# Patient Record
Sex: FEMALE | Race: WHITE | NOT HISPANIC OR LATINO | Employment: FULL TIME | URBAN - METROPOLITAN AREA
[De-identification: names, ages, dates, MRNs, and addresses within clinical notes are randomized per-mention and may not be internally consistent; named-entity substitution may affect disease eponyms.]

---

## 2017-01-25 ENCOUNTER — LAB REQUISITION (OUTPATIENT)
Dept: LAB | Facility: HOSPITAL | Age: 27
End: 2017-01-25

## 2017-01-25 DIAGNOSIS — N80.30 ENDOMETRIOSIS OF PELVIC PERITONEUM: ICD-10-CM

## 2017-01-25 PROCEDURE — 88305 TISSUE EXAM BY PATHOLOGIST: CPT | Performed by: SPECIALIST

## 2017-01-26 LAB
CYTO UR: NORMAL
LAB AP CASE REPORT: NORMAL
LAB AP CLINICAL INFORMATION: NORMAL
Lab: NORMAL
PATH REPORT.FINAL DX SPEC: NORMAL
PATH REPORT.GROSS SPEC: NORMAL

## 2017-05-25 ENCOUNTER — TRANSCRIBE ORDERS (OUTPATIENT)
Dept: LAB | Facility: HOSPITAL | Age: 27
End: 2017-05-25

## 2017-05-25 ENCOUNTER — APPOINTMENT (OUTPATIENT)
Dept: LAB | Facility: HOSPITAL | Age: 27
End: 2017-05-25

## 2017-05-25 DIAGNOSIS — Z34.90 PREGNANCY, UNSPECIFIED GESTATIONAL AGE: Primary | ICD-10-CM

## 2017-05-25 LAB — HCG INTACT+B SERPL-ACNC: 181 MIU/ML

## 2017-05-25 PROCEDURE — 36415 COLL VENOUS BLD VENIPUNCTURE: CPT | Performed by: SPECIALIST

## 2017-05-25 PROCEDURE — 84702 CHORIONIC GONADOTROPIN TEST: CPT | Performed by: SPECIALIST

## 2017-07-11 LAB
EXTERNAL HEPATITIS B SURFACE ANTIGEN: NEGATIVE
EXTERNAL RUBELLA QUALITATIVE: NORMAL
EXTERNAL SYPHILIS RPR SCREEN: NORMAL

## 2017-08-09 ENCOUNTER — TRANSCRIBE ORDERS (OUTPATIENT)
Dept: OBSTETRICS AND GYNECOLOGY | Facility: HOSPITAL | Age: 27
End: 2017-08-09

## 2017-08-09 DIAGNOSIS — Z13.89 ENCOUNTER FOR ROUTINE SCREENING FOR MALFORMATION USING ULTRASONICS: Primary | ICD-10-CM

## 2017-09-05 ENCOUNTER — APPOINTMENT (OUTPATIENT)
Dept: WOMENS IMAGING | Facility: HOSPITAL | Age: 27
End: 2017-09-05
Attending: OBSTETRICS & GYNECOLOGY

## 2017-09-06 ENCOUNTER — OFFICE VISIT (OUTPATIENT)
Dept: OBSTETRICS AND GYNECOLOGY | Facility: HOSPITAL | Age: 27
End: 2017-09-06

## 2017-09-06 ENCOUNTER — HOSPITAL ENCOUNTER (OUTPATIENT)
Dept: WOMENS IMAGING | Facility: HOSPITAL | Age: 27
Discharge: HOME | End: 2017-09-06
Attending: OBSTETRICS & GYNECOLOGY | Admitting: OBSTETRICS & GYNECOLOGY

## 2017-09-06 VITALS
HEIGHT: 67 IN | SYSTOLIC BLOOD PRESSURE: 128 MMHG | DIASTOLIC BLOOD PRESSURE: 86 MMHG | WEIGHT: 247 LBS | BODY MASS INDEX: 38.77 KG/M2

## 2017-09-06 DIAGNOSIS — O30.049 TWIN PREGNANCY, TWINS DICHORIONIC AND DIAMNIOTIC: Primary | ICD-10-CM

## 2017-09-06 DIAGNOSIS — Z13.89 ENCOUNTER FOR ROUTINE SCREENING FOR MALFORMATION USING ULTRASONICS: ICD-10-CM

## 2017-09-06 PROCEDURE — 76811 OB US DETAILED SNGL FETUS: CPT | Performed by: OBSTETRICS & GYNECOLOGY

## 2017-09-06 PROCEDURE — 76812 OB US DETAILED ADDL FETUS: CPT | Performed by: OBSTETRICS & GYNECOLOGY

## 2017-09-06 PROCEDURE — 76812 OB US DETAILED ADDL FETUS: CPT

## 2017-09-06 PROCEDURE — 99241 PR OFFICE CONSULTATION NEW/ESTAB PATIENT 15 MIN: CPT | Performed by: OBSTETRICS & GYNECOLOGY

## 2017-09-06 PROCEDURE — 76811 OB US DETAILED SNGL FETUS: CPT

## 2017-09-06 RX ORDER — FOLIC ACID 1 MG/1
4 TABLET ORAL DAILY
COMMUNITY
End: 2017-09-27

## 2017-09-06 RX ORDER — PRENATAL WITH FERROUS FUM AND FOLIC ACID 3080; 920; 120; 400; 22; 1.84; 3; 20; 10; 1; 12; 200; 27; 25; 2 [IU]/1; [IU]/1; MG/1; [IU]/1; MG/1; MG/1; MG/1; MG/1; MG/1; MG/1; UG/1; MG/1; MG/1; MG/1; MG/1
TABLET ORAL DAILY
COMMUNITY

## 2017-09-06 NOTE — PROGRESS NOTES
Documentation of the ultrasound findings, images, and interpretations will be available in the patient's ViewPoint report located in the chart review imaging tab in [employer].

## 2017-09-27 ENCOUNTER — OFFICE VISIT (OUTPATIENT)
Dept: OBSTETRICS AND GYNECOLOGY | Facility: HOSPITAL | Age: 27
End: 2017-09-27

## 2017-09-27 ENCOUNTER — HOSPITAL ENCOUNTER (OUTPATIENT)
Dept: WOMENS IMAGING | Facility: HOSPITAL | Age: 27
Discharge: HOME | End: 2017-09-27
Attending: OBSTETRICS & GYNECOLOGY | Admitting: OBSTETRICS & GYNECOLOGY

## 2017-09-27 VITALS — WEIGHT: 258.6 LBS | SYSTOLIC BLOOD PRESSURE: 141 MMHG | BODY MASS INDEX: 40.5 KG/M2 | DIASTOLIC BLOOD PRESSURE: 63 MMHG

## 2017-09-27 DIAGNOSIS — O30.049 TWIN PREGNANCY, TWINS DICHORIONIC AND DIAMNIOTIC: Primary | ICD-10-CM

## 2017-09-27 DIAGNOSIS — O26.872 SHORT CERVIX DURING PREGNANCY IN SECOND TRIMESTER: ICD-10-CM

## 2017-09-27 DIAGNOSIS — E66.01 MORBID OBESITY DUE TO EXCESS CALORIES (HCC): ICD-10-CM

## 2017-09-27 DIAGNOSIS — O30.049 TWIN PREGNANCY, TWINS DICHORIONIC AND DIAMNIOTIC: ICD-10-CM

## 2017-09-27 PROCEDURE — 76817 TRANSVAGINAL US OBSTETRIC: CPT | Performed by: OBSTETRICS & GYNECOLOGY

## 2017-09-27 PROCEDURE — 76816 OB US FOLLOW-UP PER FETUS: CPT | Performed by: OBSTETRICS & GYNECOLOGY

## 2017-09-27 PROCEDURE — 76817 TRANSVAGINAL US OBSTETRIC: CPT

## 2017-09-27 PROCEDURE — 93325 DOPPLER ECHO COLOR FLOW MAPG: CPT | Performed by: OBSTETRICS & GYNECOLOGY

## 2017-09-27 PROCEDURE — 93325 DOPPLER ECHO COLOR FLOW MAPG: CPT

## 2017-09-27 PROCEDURE — 76825 ECHO EXAM OF FETAL HEART: CPT

## 2017-09-27 PROCEDURE — 76816 OB US FOLLOW-UP PER FETUS: CPT

## 2017-09-27 PROCEDURE — 76825 ECHO EXAM OF FETAL HEART: CPT | Performed by: OBSTETRICS & GYNECOLOGY

## 2017-09-27 PROCEDURE — 99242 OFF/OP CONSLTJ NEW/EST SF 20: CPT | Performed by: OBSTETRICS & GYNECOLOGY

## 2017-09-27 RX ORDER — UREA 10 %
3200 LOTION (ML) TOPICAL DAILY
COMMUNITY
End: 2018-01-16 | Stop reason: HOSPADM

## 2017-09-27 NOTE — PROGRESS NOTES
Documentation of the ultrasound findings, images, and interpretations will be available in the patient's ViewPoint report located in the chart review imaging tab in [employer].

## 2017-09-28 ENCOUNTER — HOSPITAL ENCOUNTER (INPATIENT)
Facility: HOSPITAL | Age: 27
LOS: 6 days | Discharge: HOME | End: 2017-10-04
Attending: OBSTETRICS & GYNECOLOGY | Admitting: OBSTETRICS & GYNECOLOGY

## 2017-09-28 LAB
ABO GROUP BLD: NORMAL
BASOPHILS # BLD AUTO: 0.03 10*3/MM3 (ref 0–0.2)
BASOPHILS NFR BLD AUTO: 0.2 % (ref 0–1)
BLD GP AB SCN SERPL QL: NEGATIVE
DEPRECATED RDW RBC AUTO: 48.8 FL (ref 37–54)
DEPRECATED RDW RBC AUTO: 49.9 FL (ref 37–54)
EOSINOPHIL # BLD AUTO: 0.11 10*3/MM3 (ref 0–0.3)
EOSINOPHIL NFR BLD AUTO: 0.7 % (ref 0–3)
ERYTHROCYTE [DISTWIDTH] IN BLOOD BY AUTOMATED COUNT: 14.4 % (ref 11.3–14.5)
ERYTHROCYTE [DISTWIDTH] IN BLOOD BY AUTOMATED COUNT: 14.5 % (ref 11.3–14.5)
HCT VFR BLD AUTO: 35.6 % (ref 34.5–44)
HCT VFR BLD AUTO: 36.9 % (ref 34.5–44)
HGB BLD-MCNC: 11.8 G/DL (ref 11.5–15.5)
HGB BLD-MCNC: 12.5 G/DL (ref 11.5–15.5)
IMM GRANULOCYTES # BLD: 0.66 10*3/MM3 (ref 0–0.03)
IMM GRANULOCYTES NFR BLD: 4.5 % (ref 0–0.6)
LYMPHOCYTES # BLD AUTO: 1.65 10*3/MM3 (ref 0.6–4.8)
LYMPHOCYTES NFR BLD AUTO: 11.1 % (ref 24–44)
MCH RBC QN AUTO: 31.4 PG (ref 27–31)
MCH RBC QN AUTO: 32 PG (ref 27–31)
MCHC RBC AUTO-ENTMCNC: 33.1 G/DL (ref 32–36)
MCHC RBC AUTO-ENTMCNC: 33.9 G/DL (ref 32–36)
MCV RBC AUTO: 94.4 FL (ref 80–99)
MCV RBC AUTO: 94.7 FL (ref 80–99)
MONOCYTES # BLD AUTO: 0.8 10*3/MM3 (ref 0–1)
MONOCYTES NFR BLD AUTO: 5.4 % (ref 0–12)
NEUTROPHILS # BLD AUTO: 11.58 10*3/MM3 (ref 1.5–8.3)
NEUTROPHILS NFR BLD AUTO: 78.1 % (ref 41–71)
PLATELET # BLD AUTO: 242 10*3/MM3 (ref 150–450)
PLATELET # BLD AUTO: 243 10*3/MM3 (ref 150–450)
PMV BLD AUTO: 10.3 FL (ref 6–12)
PMV BLD AUTO: 10.4 FL (ref 6–12)
RBC # BLD AUTO: 3.76 10*6/MM3 (ref 3.89–5.14)
RBC # BLD AUTO: 3.91 10*6/MM3 (ref 3.89–5.14)
RH BLD: POSITIVE
WBC NRBC COR # BLD: 14.59 10*3/MM3 (ref 3.5–10.8)
WBC NRBC COR # BLD: 14.83 10*3/MM3 (ref 3.5–10.8)

## 2017-09-28 PROCEDURE — 86900 BLOOD TYPING SEROLOGIC ABO: CPT | Performed by: OBSTETRICS & GYNECOLOGY

## 2017-09-28 PROCEDURE — 85027 COMPLETE CBC AUTOMATED: CPT | Performed by: OBSTETRICS & GYNECOLOGY

## 2017-09-28 PROCEDURE — 85025 COMPLETE CBC W/AUTO DIFF WBC: CPT | Performed by: OBSTETRICS & GYNECOLOGY

## 2017-09-28 PROCEDURE — 86901 BLOOD TYPING SEROLOGIC RH(D): CPT | Performed by: OBSTETRICS & GYNECOLOGY

## 2017-09-28 PROCEDURE — 25010000002 MAGNESIUM SULFATE-LACT RINGERS 40 GM/580ML SOLUTION: Performed by: OBSTETRICS & GYNECOLOGY

## 2017-09-28 PROCEDURE — 86850 RBC ANTIBODY SCREEN: CPT | Performed by: OBSTETRICS & GYNECOLOGY

## 2017-09-28 PROCEDURE — 99221 1ST HOSP IP/OBS SF/LOW 40: CPT | Performed by: OBSTETRICS & GYNECOLOGY

## 2017-09-28 RX ORDER — ACETAMINOPHEN 325 MG/1
650 TABLET ORAL EVERY 4 HOURS PRN
Status: DISCONTINUED | OUTPATIENT
Start: 2017-09-28 | End: 2017-10-04 | Stop reason: HOSPADM

## 2017-09-28 RX ORDER — FAMOTIDINE 20 MG/1
20 TABLET, FILM COATED ORAL 2 TIMES DAILY PRN
Status: DISCONTINUED | OUTPATIENT
Start: 2017-09-28 | End: 2017-10-04 | Stop reason: HOSPADM

## 2017-09-28 RX ORDER — ONDANSETRON 2 MG/ML
4 INJECTION INTRAMUSCULAR; INTRAVENOUS EVERY 8 HOURS PRN
Status: DISCONTINUED | OUTPATIENT
Start: 2017-09-28 | End: 2017-10-04 | Stop reason: HOSPADM

## 2017-09-28 RX ORDER — CEFAZOLIN SODIUM 2 G/100ML
2 INJECTION, SOLUTION INTRAVENOUS ONCE
Status: DISCONTINUED | OUTPATIENT
Start: 2017-09-28 | End: 2017-09-28

## 2017-09-28 RX ORDER — PRENATAL VIT/IRON FUM/FOLIC AC 27MG-0.8MG
1 TABLET ORAL DAILY
Status: DISCONTINUED | OUTPATIENT
Start: 2017-09-28 | End: 2017-10-01

## 2017-09-28 RX ORDER — LIDOCAINE HYDROCHLORIDE 10 MG/ML
5 INJECTION, SOLUTION INFILTRATION; PERINEURAL AS NEEDED
Status: DISCONTINUED | OUTPATIENT
Start: 2017-09-28 | End: 2017-09-28

## 2017-09-28 RX ORDER — DEXTROSE AND SODIUM CHLORIDE 5; .2 G/100ML; G/100ML
100 INJECTION, SOLUTION INTRAVENOUS CONTINUOUS
Status: DISCONTINUED | OUTPATIENT
Start: 2017-09-28 | End: 2017-10-04 | Stop reason: HOSPADM

## 2017-09-28 RX ORDER — SODIUM CHLORIDE 0.9 % (FLUSH) 0.9 %
1-10 SYRINGE (ML) INJECTION AS NEEDED
Status: DISCONTINUED | OUTPATIENT
Start: 2017-09-28 | End: 2017-10-04 | Stop reason: HOSPADM

## 2017-09-28 RX ORDER — MAGNESIUM SULF/RINGERS LACTATE 40 G/500ML
2 PLASTIC BAG, INJECTION (ML) INTRAVENOUS CONTINUOUS
Status: DISPENSED | OUTPATIENT
Start: 2017-09-28 | End: 2017-10-01

## 2017-09-28 RX ORDER — SODIUM CHLORIDE 0.9 % (FLUSH) 0.9 %
1-10 SYRINGE (ML) INJECTION AS NEEDED
Status: DISCONTINUED | OUTPATIENT
Start: 2017-09-28 | End: 2017-10-02

## 2017-09-28 RX ORDER — LIDOCAINE HYDROCHLORIDE 10 MG/ML
5 INJECTION, SOLUTION INFILTRATION; PERINEURAL AS NEEDED
Status: DISCONTINUED | OUTPATIENT
Start: 2017-09-28 | End: 2017-10-02

## 2017-09-28 RX ORDER — MAGNESIUM SULF/RINGERS LACTATE 40 G/500ML
PLASTIC BAG, INJECTION (ML) INTRAVENOUS
Status: DISPENSED
Start: 2017-09-28 | End: 2017-09-28

## 2017-09-28 RX ADMIN — DEXTROSE AND SODIUM CHLORIDE 100 ML/HR: 5; 200 INJECTION, SOLUTION INTRAVENOUS at 10:30

## 2017-09-28 RX ADMIN — DEXTROSE AND SODIUM CHLORIDE 100 ML/HR: 5; 200 INJECTION, SOLUTION INTRAVENOUS at 21:23

## 2017-09-28 RX ADMIN — Medication 2 G/HR: at 12:00

## 2017-09-28 RX ADMIN — ACETAMINOPHEN 650 MG: 325 TABLET, FILM COATED ORAL at 20:06

## 2017-09-29 ENCOUNTER — APPOINTMENT (OUTPATIENT)
Dept: WOMENS IMAGING | Facility: HOSPITAL | Age: 27
End: 2017-09-29

## 2017-09-29 PROCEDURE — 25010000002 MAGNESIUM SULFATE-LACT RINGERS 40 GM/580ML SOLUTION: Performed by: OBSTETRICS & GYNECOLOGY

## 2017-09-29 PROCEDURE — 76817 TRANSVAGINAL US OBSTETRIC: CPT

## 2017-09-29 PROCEDURE — 99231 SBSQ HOSP IP/OBS SF/LOW 25: CPT | Performed by: OBSTETRICS & GYNECOLOGY

## 2017-09-29 PROCEDURE — 76817 TRANSVAGINAL US OBSTETRIC: CPT | Performed by: OBSTETRICS & GYNECOLOGY

## 2017-09-29 RX ORDER — AZITHROMYCIN 250 MG/1
250 TABLET, FILM COATED ORAL DAILY
Status: COMPLETED | OUTPATIENT
Start: 2017-09-30 | End: 2017-10-03

## 2017-09-29 RX ORDER — AZITHROMYCIN 250 MG/1
500 TABLET, FILM COATED ORAL DAILY
Status: COMPLETED | OUTPATIENT
Start: 2017-09-29 | End: 2017-09-29

## 2017-09-29 RX ADMIN — AZITHROMYCIN 500 MG: 250 TABLET, FILM COATED ORAL at 10:29

## 2017-09-29 RX ADMIN — PRENATAL VIT W/ FE FUMARATE-FA TAB 27-0.8 MG 1 TABLET: 27-0.8 TAB at 09:26

## 2017-09-29 RX ADMIN — Medication 2 G/HR: at 03:10

## 2017-09-29 RX ADMIN — ACETAMINOPHEN 650 MG: 325 TABLET, FILM COATED ORAL at 17:30

## 2017-09-29 RX ADMIN — DEXTROSE AND SODIUM CHLORIDE 100 ML/HR: 5; 200 INJECTION, SOLUTION INTRAVENOUS at 17:36

## 2017-09-29 RX ADMIN — AMPICILLIN SODIUM 2 G: 2 INJECTION, POWDER, FOR SOLUTION INTRAMUSCULAR; INTRAVENOUS at 10:27

## 2017-09-29 RX ADMIN — AMPICILLIN SODIUM 2 G: 2 INJECTION, POWDER, FOR SOLUTION INTRAMUSCULAR; INTRAVENOUS at 16:10

## 2017-09-29 RX ADMIN — DEXTROSE AND SODIUM CHLORIDE 100 ML/HR: 5; 200 INJECTION, SOLUTION INTRAVENOUS at 07:11

## 2017-09-29 RX ADMIN — AMPICILLIN SODIUM 2 G: 2 INJECTION, POWDER, FOR SOLUTION INTRAMUSCULAR; INTRAVENOUS at 21:40

## 2017-09-30 PROCEDURE — 99231 SBSQ HOSP IP/OBS SF/LOW 25: CPT | Performed by: OBSTETRICS & GYNECOLOGY

## 2017-09-30 PROCEDURE — 25010000002 MAGNESIUM SULFATE-LACT RINGERS 40 GM/580ML SOLUTION: Performed by: OBSTETRICS & GYNECOLOGY

## 2017-09-30 RX ORDER — SACCHAROMYCES BOULARDII 250 MG
250 CAPSULE ORAL 2 TIMES DAILY
Status: DISCONTINUED | OUTPATIENT
Start: 2017-09-30 | End: 2017-10-04 | Stop reason: HOSPADM

## 2017-09-30 RX ADMIN — AMPICILLIN SODIUM 2 G: 2 INJECTION, POWDER, FOR SOLUTION INTRAMUSCULAR; INTRAVENOUS at 22:02

## 2017-09-30 RX ADMIN — Medication 2 G/HR: at 02:52

## 2017-09-30 RX ADMIN — DEXTROSE AND SODIUM CHLORIDE 100 ML/HR: 5; 200 INJECTION, SOLUTION INTRAVENOUS at 04:12

## 2017-09-30 RX ADMIN — Medication 2 G/HR: at 22:02

## 2017-09-30 RX ADMIN — AMPICILLIN SODIUM 2 G: 2 INJECTION, POWDER, FOR SOLUTION INTRAMUSCULAR; INTRAVENOUS at 16:03

## 2017-09-30 RX ADMIN — Medication 250 MG: at 14:20

## 2017-09-30 RX ADMIN — AMPICILLIN SODIUM 2 G: 2 INJECTION, POWDER, FOR SOLUTION INTRAMUSCULAR; INTRAVENOUS at 02:52

## 2017-09-30 RX ADMIN — AMPICILLIN SODIUM 2 G: 2 INJECTION, POWDER, FOR SOLUTION INTRAMUSCULAR; INTRAVENOUS at 10:25

## 2017-09-30 RX ADMIN — DEXTROSE AND SODIUM CHLORIDE 100 ML/HR: 5; 200 INJECTION, SOLUTION INTRAVENOUS at 14:17

## 2017-09-30 RX ADMIN — PRENATAL VIT W/ FE FUMARATE-FA TAB 27-0.8 MG 1 TABLET: 27-0.8 TAB at 09:15

## 2017-09-30 RX ADMIN — AZITHROMYCIN 250 MG: 250 TABLET, FILM COATED ORAL at 09:15

## 2017-10-01 PROCEDURE — 99231 SBSQ HOSP IP/OBS SF/LOW 25: CPT | Performed by: OBSTETRICS & GYNECOLOGY

## 2017-10-01 PROCEDURE — 25010000002 MAGNESIUM SULFATE-LACT RINGERS 40 GM/580ML SOLUTION

## 2017-10-01 RX ORDER — MAGNESIUM SULF/RINGERS LACTATE 40 G/500ML
PLASTIC BAG, INJECTION (ML) INTRAVENOUS
Status: COMPLETED
Start: 2017-10-01 | End: 2017-10-01

## 2017-10-01 RX ORDER — PRENATAL VIT/IRON FUM/FOLIC AC 27MG-0.8MG
1 TABLET ORAL NIGHTLY
Status: DISCONTINUED | OUTPATIENT
Start: 2017-10-01 | End: 2017-10-04 | Stop reason: HOSPADM

## 2017-10-01 RX ADMIN — DEXTROSE AND SODIUM CHLORIDE 100 ML/HR: 5; 200 INJECTION, SOLUTION INTRAVENOUS at 14:14

## 2017-10-01 RX ADMIN — AZITHROMYCIN 250 MG: 250 TABLET, FILM COATED ORAL at 10:36

## 2017-10-01 RX ADMIN — DEXTROSE AND SODIUM CHLORIDE 100 ML/HR: 5; 200 INJECTION, SOLUTION INTRAVENOUS at 03:49

## 2017-10-01 RX ADMIN — AMPICILLIN SODIUM 2 G: 2 INJECTION, POWDER, FOR SOLUTION INTRAMUSCULAR; INTRAVENOUS at 03:49

## 2017-10-01 RX ADMIN — Medication 2 G/HR: at 21:13

## 2017-10-01 RX ADMIN — Medication 250 MG: at 10:36

## 2017-10-01 RX ADMIN — Medication 250 MG: at 18:30

## 2017-10-01 RX ADMIN — AMPICILLIN SODIUM 2 G: 2 INJECTION, POWDER, FOR SOLUTION INTRAMUSCULAR; INTRAVENOUS at 16:24

## 2017-10-01 RX ADMIN — DOXYLAMINE SUCCINATE 25 MG: 25 TABLET ORAL at 18:29

## 2017-10-01 RX ADMIN — AMPICILLIN SODIUM 2 G: 2 INJECTION, POWDER, FOR SOLUTION INTRAMUSCULAR; INTRAVENOUS at 10:31

## 2017-10-01 RX ADMIN — PRENATAL VIT W/ FE FUMARATE-FA TAB 27-0.8 MG 1 TABLET: 27-0.8 TAB at 21:14

## 2017-10-01 RX ADMIN — AMPICILLIN SODIUM 2 G: 2 INJECTION, POWDER, FOR SOLUTION INTRAMUSCULAR; INTRAVENOUS at 21:14

## 2017-10-01 NOTE — PROGRESS NOTES
10/1/2017  HD:3  27 y.o. yo female  at 22w3d    Subjective   Hoda denies bleeding or change in d/c.       Objective   Temp: Temp:  [97.9 °F (36.6 °C)-98.4 °F (36.9 °C)] 97.9 °F (36.6 °C) Temp src: Oral   BP: BP: (105-144)/(51-97) 136/72        Pulse: Heart Rate:  [] 101  RR: Resp:  [16-18] 16    General:  nad   Abdomen: Gravid, nontender         Lab Results   Component Value Date    WBC 14.83 (H) 2017    HGB 11.8 2017    HCT 35.6 2017    MCV 94.7 2017     2017                     Assessment  1.   27 y.o. yo female  at 22w3d  2.   Twins,  Incompetent cervix.    Plan  1. Cont current hospital care.   2.   Cerclage in am.    This note has been electronically signed.    Jaky Royal MD  2017

## 2017-10-01 NOTE — PROGRESS NOTES
Daily Progress Note    Patient name: Hoda Alcantar  YOB: 1990   MRN: [id number]  Referring Provider: Jaky Royal  Admission Date: 2017  Date of Service: 10/1/2017    Hoda Alcantar is a 27 y.o.    at 22w3d  admitted on 2017 for Short cervix during pregnancy in second trimester    Hospital day 3    Diagnoses:   Patient Active Problem List   Diagnosis   • Twin pregnancy, twins dichorionic and diamniotic   • Morbid obesity due to excess calories   • Short cervix during pregnancy in second trimester       Prenatal Labs  Lab Results   Component Value Date    HGB 11.8 2017    ABO A 2017    RH Positive 2017    ABSCRN Negative 2017       Subjective:      Hoda has no complaints today.  Reports fetal movement is normal  No contractions  Denies leakage of amniotic fluid.  Denies vaginal bleeding    Objective:     Vital signs:  Vital Signs Range for the last 24 hours  Temperature: Temp:  [98.1 °F (36.7 °C)-98.4 °F (36.9 °C)] 98.4 °F (36.9 °C)   Temp Source: Temp src: Oral   BP: BP: (105-144)/(51-94) 137/75   Pulse: Heart Rate:  [] 88   Respirations: Resp:  [16-18] 18   Weight: 258 lb (117 kg)     General: Alert & oriented x4, in no apparent distress  HEENT: Grossly normal  Resp: Unlabored breathing  Abdomen: Soft, nontender  Uterus: Gravid, nontender  Extremities: Nontender, no pain, no edema   Neurologic:  Alert & oriented x 4,  no focal deficits noted  Psychiatric:  Speech and behavior appropriate     Uterine Assessment   Method: Method: TOCO (external toco transducer)   Frequency (min): Contraction Frequency (min): 1 ctx noted   Ctx Count in 10 min:     Duration: Contraction Duration (sec): 70   Intensity: Contraction Intensity: no contractions   Intensity by IUPC:     Resting Tone: Uterine Resting Tone: soft by palpation   Resting Tone by IUPC:     Simpson Units:         Medications:    ampicillin 2 g Intravenous Q6H   azithromycin 250 mg Oral Daily    prenatal vitamin 27-0.8 1 tablet Oral Daily   saccharomyces boulardii 250 mg Oral BID      •  acetaminophen  •  doxylamine  •  famotidine  •  lidocaine  •  ondansetron  •  sodium chloride  •  sodium chloride    Labs:  Lab Results   Component Value Date    WBC 14.83 (H) 2017    HGB 11.8 2017    HCT 35.6 2017    MCV 94.7 2017     2017       Results from last 7 days  Lab Units 17  1132   ABO TYPING  A   RH TYPING  Positive   ANTIBODY SCREEN  Negative       Assessment/Plan:      Hoda is a 27 y.o.    at 22w3d with Short cervix during pregnancy in second trimester    Hospital Problem List     * (Principal)Short cervix during pregnancy in second trimester    Twin pregnancy, twins dichorionic and diamniotic    Morbid obesity due to excess calories            Continue IV magensium sulfate and antibiotics. Cerclage scheduled for tomorrow.    I spent 15 minutes with this patient of which greater than 50% was face to face counseling and coordination of care.  All questions were answered to the best my ablity.    Jerome Brenner MD  10/1/2017 9:59 AM

## 2017-10-02 ENCOUNTER — ANESTHESIA EVENT (OUTPATIENT)
Dept: LABOR AND DELIVERY | Facility: HOSPITAL | Age: 27
End: 2017-10-02

## 2017-10-02 ENCOUNTER — ANESTHESIA (OUTPATIENT)
Dept: LABOR AND DELIVERY | Facility: HOSPITAL | Age: 27
End: 2017-10-02

## 2017-10-02 PROCEDURE — 25010000002 MAGNESIUM SULFATE-LACT RINGERS 40 GM/580ML SOLUTION: Performed by: OBSTETRICS & GYNECOLOGY

## 2017-10-02 PROCEDURE — 59320 REVISION OF CERVIX: CPT | Performed by: OBSTETRICS & GYNECOLOGY

## 2017-10-02 PROCEDURE — 25010000002 MIDAZOLAM PER 1 MG: Performed by: ANESTHESIOLOGY

## 2017-10-02 PROCEDURE — 0UVC7ZZ RESTRICTION OF CERVIX, VIA NATURAL OR ARTIFICIAL OPENING: ICD-10-PCS | Performed by: OBSTETRICS & GYNECOLOGY

## 2017-10-02 PROCEDURE — 25010000002 FENTANYL CITRATE (PF) 100 MCG/2ML SOLUTION: Performed by: ANESTHESIOLOGY

## 2017-10-02 PROCEDURE — 25010000002 PROMETHAZINE PER 50 MG: Performed by: ANESTHESIOLOGY

## 2017-10-02 PROCEDURE — 25010000002 ONDANSETRON PER 1 MG: Performed by: ANESTHESIOLOGY

## 2017-10-02 RX ORDER — ONDANSETRON 2 MG/ML
INJECTION INTRAMUSCULAR; INTRAVENOUS AS NEEDED
Status: DISCONTINUED | OUTPATIENT
Start: 2017-10-02 | End: 2017-10-02 | Stop reason: SURG

## 2017-10-02 RX ORDER — FENTANYL CITRATE 50 UG/ML
INJECTION, SOLUTION INTRAMUSCULAR; INTRAVENOUS AS NEEDED
Status: DISCONTINUED | OUTPATIENT
Start: 2017-10-02 | End: 2017-10-02 | Stop reason: SURG

## 2017-10-02 RX ORDER — FAMOTIDINE 10 MG/ML
INJECTION, SOLUTION INTRAVENOUS AS NEEDED
Status: DISCONTINUED | OUTPATIENT
Start: 2017-10-02 | End: 2017-10-02 | Stop reason: SURG

## 2017-10-02 RX ORDER — MAGNESIUM SULF/RINGERS LACTATE 40 G/500ML
2 PLASTIC BAG, INJECTION (ML) INTRAVENOUS CONTINUOUS
Status: DISCONTINUED | OUTPATIENT
Start: 2017-10-02 | End: 2017-10-03

## 2017-10-02 RX ORDER — SODIUM CHLORIDE, SODIUM LACTATE, POTASSIUM CHLORIDE, CALCIUM CHLORIDE 600; 310; 30; 20 MG/100ML; MG/100ML; MG/100ML; MG/100ML
125 INJECTION, SOLUTION INTRAVENOUS CONTINUOUS
Status: DISCONTINUED | OUTPATIENT
Start: 2017-10-02 | End: 2017-10-04 | Stop reason: HOSPADM

## 2017-10-02 RX ORDER — BUPIVACAINE HYDROCHLORIDE 7.5 MG/ML
INJECTION, SOLUTION EPIDURAL; RETROBULBAR AS NEEDED
Status: DISCONTINUED | OUTPATIENT
Start: 2017-10-02 | End: 2017-10-02 | Stop reason: SURG

## 2017-10-02 RX ORDER — PROMETHAZINE HYDROCHLORIDE 25 MG/ML
INJECTION, SOLUTION INTRAMUSCULAR; INTRAVENOUS AS NEEDED
Status: DISCONTINUED | OUTPATIENT
Start: 2017-10-02 | End: 2017-10-02 | Stop reason: SURG

## 2017-10-02 RX ORDER — TRISODIUM CITRATE DIHYDRATE AND CITRIC ACID MONOHYDRATE 500; 334 MG/5ML; MG/5ML
30 SOLUTION ORAL ONCE
Status: COMPLETED | OUTPATIENT
Start: 2017-10-02 | End: 2017-10-02

## 2017-10-02 RX ORDER — MIDAZOLAM HYDROCHLORIDE 1 MG/ML
INJECTION INTRAMUSCULAR; INTRAVENOUS AS NEEDED
Status: DISCONTINUED | OUTPATIENT
Start: 2017-10-02 | End: 2017-10-02 | Stop reason: SURG

## 2017-10-02 RX ADMIN — AZITHROMYCIN 250 MG: 250 TABLET, FILM COATED ORAL at 09:08

## 2017-10-02 RX ADMIN — EPHEDRINE SULFATE 10 MG: 50 INJECTION INTRAMUSCULAR; INTRAVENOUS; SUBCUTANEOUS at 11:14

## 2017-10-02 RX ADMIN — FENTANYL CITRATE 25 MCG: 50 INJECTION, SOLUTION INTRAMUSCULAR; INTRAVENOUS at 11:18

## 2017-10-02 RX ADMIN — SODIUM CHLORIDE, POTASSIUM CHLORIDE, SODIUM LACTATE AND CALCIUM CHLORIDE: 600; 310; 30; 20 INJECTION, SOLUTION INTRAVENOUS at 10:56

## 2017-10-02 RX ADMIN — AMPICILLIN SODIUM 2 G: 2 INJECTION, POWDER, FOR SOLUTION INTRAMUSCULAR; INTRAVENOUS at 22:12

## 2017-10-02 RX ADMIN — ACETAMINOPHEN 650 MG: 325 TABLET, FILM COATED ORAL at 14:57

## 2017-10-02 RX ADMIN — MIDAZOLAM HYDROCHLORIDE 1 MG: 1 INJECTION, SOLUTION INTRAMUSCULAR; INTRAVENOUS at 10:57

## 2017-10-02 RX ADMIN — EPHEDRINE SULFATE 10 MG: 50 INJECTION INTRAMUSCULAR; INTRAVENOUS; SUBCUTANEOUS at 11:11

## 2017-10-02 RX ADMIN — SODIUM CHLORIDE, POTASSIUM CHLORIDE, SODIUM LACTATE AND CALCIUM CHLORIDE 1000 ML/HR: 600; 310; 30; 20 INJECTION, SOLUTION INTRAVENOUS at 10:49

## 2017-10-02 RX ADMIN — ONDANSETRON 4 MG: 2 INJECTION INTRAMUSCULAR; INTRAVENOUS at 10:57

## 2017-10-02 RX ADMIN — DEXTROSE AND SODIUM CHLORIDE 100 ML/HR: 5; 200 INJECTION, SOLUTION INTRAVENOUS at 01:07

## 2017-10-02 RX ADMIN — SODIUM CITRATE AND CITRIC ACID MONOHYDRATE 30 ML: 500; 334 SOLUTION ORAL at 10:49

## 2017-10-02 RX ADMIN — MIDAZOLAM HYDROCHLORIDE 1 MG: 1 INJECTION, SOLUTION INTRAMUSCULAR; INTRAVENOUS at 11:07

## 2017-10-02 RX ADMIN — AMPICILLIN SODIUM 2 G: 2 INJECTION, POWDER, FOR SOLUTION INTRAMUSCULAR; INTRAVENOUS at 16:01

## 2017-10-02 RX ADMIN — Medication 250 MG: at 17:33

## 2017-10-02 RX ADMIN — Medication 250 MG: at 09:07

## 2017-10-02 RX ADMIN — AMPICILLIN SODIUM 2 G: 2 INJECTION, POWDER, FOR SOLUTION INTRAMUSCULAR; INTRAVENOUS at 09:06

## 2017-10-02 RX ADMIN — AMPICILLIN SODIUM 2 G: 2 INJECTION, POWDER, FOR SOLUTION INTRAMUSCULAR; INTRAVENOUS at 04:42

## 2017-10-02 RX ADMIN — SODIUM CHLORIDE, POTASSIUM CHLORIDE, SODIUM LACTATE AND CALCIUM CHLORIDE 1000 ML: 600; 310; 30; 20 INJECTION, SOLUTION INTRAVENOUS at 10:26

## 2017-10-02 RX ADMIN — PROMETHAZINE HYDROCHLORIDE 12.5 MG: 25 INJECTION INTRAMUSCULAR; INTRAVENOUS at 11:15

## 2017-10-02 RX ADMIN — Medication 2 G/HR: at 16:00

## 2017-10-02 RX ADMIN — FAMOTIDINE 20 MG: 10 INJECTION, SOLUTION INTRAVENOUS at 10:58

## 2017-10-02 RX ADMIN — DEXTROSE AND SODIUM CHLORIDE 100 ML/HR: 5; 200 INJECTION, SOLUTION INTRAVENOUS at 13:10

## 2017-10-02 RX ADMIN — BUPIVACAINE HYDROCHLORIDE 1.6 ML: 7.5 INJECTION, SOLUTION EPIDURAL; RETROBULBAR at 11:05

## 2017-10-02 RX ADMIN — PRENATAL VIT W/ FE FUMARATE-FA TAB 27-0.8 MG 1 TABLET: 27-0.8 TAB at 22:12

## 2017-10-02 NOTE — ANESTHESIA POSTPROCEDURE EVALUATION
Patient: Hoda Alcantar    Procedure Summary     Date Anesthesia Start Anesthesia Stop Room / Location    10/02/17 1056  [facility] /  [facility]       Procedure Diagnosis Surgeon Provider    CERVICAL CERCLAGE (N/A Cervix) No diagnosis on file. Douglas A Milligan, MD Stephanie A Shumate,           Anesthesia Type: spinal  Last vitals  /65       Temp     98.2   Pulse    89   Resp     18   SpO2     96     Post Anesthesia Care and Evaluation    Patient location during evaluation: bedside  Patient participation: complete - patient participated  Level of consciousness: awake  Pain score: 0  Pain management: satisfactory to patient  Airway patency: patent  Anesthetic complications: No anesthetic complications    Cardiovascular status: acceptable  Respiratory status: acceptable  Hydration status: acceptable

## 2017-10-02 NOTE — NURSING NOTE
1700--patient concerned that she was bleeding too much, they had changed the chux and there was only a 1 in smear of bleeding on chux, urine hat emptied of clear yellow urine, reassured patient and family that this was all normal , v/u

## 2017-10-02 NOTE — ANESTHESIA PREPROCEDURE EVALUATION
Anesthesia Evaluation     Patient summary reviewed and Nursing notes reviewed   NPO Solid Status: > 8 hours  NPO Liquid Status: > 8 hours     Airway   Mallampati: II  Neck ROM: full  possible difficult intubation  Dental      Pulmonary - negative pulmonary ROS   Cardiovascular - negative cardio ROS        Neuro/Psych- negative ROS  GI/Hepatic/Renal/Endo    (+) morbid obesity,     Musculoskeletal (-) negative ROS    Abdominal    Substance History - negative use     OB/GYN    (+) Pregnant,         Other - negative ROS                                       Anesthesia Plan    ASA 3     spinal     Anesthetic plan and risks discussed with patient and spouse/significant other.

## 2017-10-02 NOTE — PLAN OF CARE
Problem: Cervical Cerclage for Cervical Insufficiency (Adult,Obstetrics,Pediatric)  Intervention: Promote/Monitor Maternal/Fetal Well-being    10/02/17 1101   Promote/Monitor Maternal/Fetal Well-being   Contraction Intensity no contractions   Uterine Resting Tone soft by palpation       Intervention: Prevent/Manage Maternal Infection    10/02/17 1101   Safety Interventions   Infection Prevention rest/sleep promoted   Hygiene Care Assistance   Perineal Care cleansed;absorbent pad changed   Safety Interventions   Infection Management aseptic technique maintained       Intervention: Implement/Monitor Measures to Manage  Labor    10/02/17 1101   Nutrition Interventions   Fluid/Electrolyte Management intravenous fluid replacement initiated         Goal: Signs and Symptoms of Listed Potential Problems Will be Absent or Manageable (Cervical Cerclage for Cervical Insufficiency)  Outcome: Ongoing (interventions implemented as appropriate)    10/02/17 1101   Cervical Cerclage for Cervical Insufficiency   Problems Assessed (Cervical Insufficiency/Cerclage) all   Problems Present (Cervical Insufficiency/Cerclage) none

## 2017-10-02 NOTE — OP NOTE
Horacio  Cerclage Operative Note    Pre-Operative Dx:   1.  IUP at 22 weeks   2. Incompetent Cervix   This patient has no babies on file.   Postoperative dx:    1.  Same     Procedure: Procedure(s):  CERVICAL CERCLAGE   Surgeon: Milligan   Assistant:    Anesthesia: SAB   EBL: 50 mls.                   Antibiotics: ampicillin (Omnipen) and Zithromax       Procedure Details:   Indication: Shows a 22 weeks gestation with a markedly short cervix with significant amount of funneling. She has a twin known twin gestation we discussed the controversy regarding cerclages in twin gestations.  She had absolutely no evidence of premature labor we offered the option of a cerclage.  Patient elected to proceed with a cerclage understanding the risks and benefits.    Procedure: As brought in the operative placed in the operative table and placed in high lithotomy position after obtaining having a spinal block placed by the anesthesia service.  She was then prepped and draped in usual sterile fashion.  After ascertaining adequate anesthesia weighted speculum was paced in posterior vagina and the anterior lip of the cervix was grasped with a ring forcep.  A 5 mm Mersilene band was a then placed in pursestring fashion about the cervix is high as possible.  Appeared to be good purchase around the entire cervix.  The cerclage was then tied down over a loop 0 Prolene to aid in removal the cerclage.  There was good approximation of the endocervical canal. Of note prior to the cerclage the membranes were visible in the endocervical canal these clearly retracted behind the cerclage when it was tied down.  There was no evidence rupture membranes.  The procedure was terminated this point and the patient was taken down out of high lithotomy position transferred a hospital bed and transferred recovery room awake and in stable condition.  There were no complications.  Estimated blood loss was 50 MLS.   Drains: none     Complications:   None       Disposition:   Patient to [facility]          Douglas A. Milligan, MD  10/2/2017  11:36 AM

## 2017-10-02 NOTE — PLAN OF CARE
Problem: Cervical Cerclage for Cervical Insufficiency (Adult,Obstetrics,Pediatric)  Intervention: Promote/Monitor Maternal/Fetal Well-being    10/01/17 1920 10/02/17 0400 10/02/17 0411   Promote Fetal Well-being   Fetal Movement --  --  active   Fetal HR Assessment Method intermittent auscultation, using Doppler --  --    Fetal HR (Beats/Min) 128 --  --    Promote/Monitor Maternal/Fetal Well-being   Method --  TOCO (external toco transducer) --    Contraction Intensity --  --  no contractions   Uterine Resting Tone --  --  soft by palpation   Positioning   Positioning --  --  semi-Fowlers         Goal: Signs and Symptoms of Listed Potential Problems Will be Absent or Manageable (Cervical Cerclage for Cervical Insufficiency)  Outcome: Ongoing (interventions implemented as appropriate)    10/02/17 0411   Cervical Cerclage for Cervical Insufficiency   Problems Assessed (Cervical Insufficiency/Cerclage) all   Problems Present (Cervical Insufficiency/Cerclage) none

## 2017-10-03 PROCEDURE — 99231 SBSQ HOSP IP/OBS SF/LOW 25: CPT | Performed by: OBSTETRICS & GYNECOLOGY

## 2017-10-03 RX ADMIN — AZITHROMYCIN 250 MG: 250 TABLET, FILM COATED ORAL at 08:37

## 2017-10-03 RX ADMIN — AMPICILLIN SODIUM 2 G: 2 INJECTION, POWDER, FOR SOLUTION INTRAMUSCULAR; INTRAVENOUS at 10:40

## 2017-10-03 RX ADMIN — Medication 250 MG: at 18:21

## 2017-10-03 RX ADMIN — Medication 250 MG: at 08:37

## 2017-10-03 RX ADMIN — AMPICILLIN SODIUM 2 G: 2 INJECTION, POWDER, FOR SOLUTION INTRAMUSCULAR; INTRAVENOUS at 15:41

## 2017-10-03 RX ADMIN — AMPICILLIN SODIUM 2 G: 2 INJECTION, POWDER, FOR SOLUTION INTRAMUSCULAR; INTRAVENOUS at 04:06

## 2017-10-03 RX ADMIN — AMPICILLIN SODIUM 2 G: 2 INJECTION, POWDER, FOR SOLUTION INTRAMUSCULAR; INTRAVENOUS at 22:05

## 2017-10-03 NOTE — PROGRESS NOTES
Daily Progress Note    Patient name: Hoda Alcantar  YOB: 1990   MRN: [id number]  Referring Provider: Jaky Royal  Admission Date: 2017  Date of Service: 10/3/2017    Hoda Alcantar is a 27 y.o.    at 22w5d  admitted on 2017 for Short cervix during pregnancy in second trimester    Hospital day 5    Diagnoses:   Patient Active Problem List   Diagnosis   • Twin pregnancy, twins dichorionic and diamniotic   • Morbid obesity due to excess calories   • Short cervix during pregnancy in second trimester       Prenatal Labs  Lab Results   Component Value Date    HGB 11.8 2017    ABO A 2017    RH Positive 2017    ABSCRN Negative 2017       Subjective:      Hoda has no complaints today.  Reports fetal movement is normal  No contractions  Denies leakage of amniotic fluid.  Denies vaginal bleeding    Objective:     Vital signs:  Vital Signs Range for the last 24 hours  Temperature: Temp:  [97.8 °F (36.6 °C)-98.6 °F (37 °C)] 98.2 °F (36.8 °C)   Temp Source: Temp src: Oral   BP: BP: (102-161)/(52-81) 102/67   Pulse: Heart Rate:  [] 109   Respirations: Resp:  [16-18] 16   Weight: 258 lb (117 kg)     General: Alert & oriented x4, in no apparent distress  HEENT: Grossly normal  Resp: Unlabored breathing  Abdomen: Soft, nontender  Uterus: Gravid, nontender  Extremities: Nontender, no pain, no edema   Neurologic:  Alert & oriented x 4,  no focal deficits noted  Psychiatric:  Speech and behavior appropriate     Non-Stress Test:  Fetal Heart Rate Assessment   Method: Fetal HR Assessment Method: external   Beats/min: Fetal HR (Beats/Min): 147   Baseline:     Varibility:     Accels:     Decels:     Tracing Category:       Uterine Assessment   Method: Method: TOCO (external toco transducer)   Frequency (min): Contraction Frequency (min): toco adjusted   Ctx Count in 10 min:     Duration: Contraction Duration (sec): 40   Intensity: Contraction Intensity: no contractions    Intensity by IUPC: Contraction Intensity (mm Hg) by IUPC: diff tracing toco adjusted   Resting Tone: Uterine Resting Tone: soft by palpation   Resting Tone by IUPC:     Strawberry Units:       Cervix: Exam by:     Dilation:     Effacement:     Station:         Most recent ultrasound:      Medications:    ampicillin 2 g Intravenous Q6H   prenatal vitamin 27-0.8 1 tablet Oral Nightly   saccharomyces boulardii 250 mg Oral BID      •  acetaminophen  •  doxylamine  •  famotidine  •  ondansetron  •  sodium chloride    Labs:  Lab Results   Component Value Date    WBC 14.83 (H) 2017    HGB 11.8 2017    HCT 35.6 2017    MCV 94.7 2017     2017       Results from last 7 days  Lab Units 17  1132   ABO TYPING  A   RH TYPING  Positive   ANTIBODY SCREEN  Negative       Assessment/Plan:      Hoda is a 27 y.o.    at 22w5d with Short cervix during pregnancy in second trimester    Hospital Problem List     * (Principal)Short cervix during pregnancy in second trimester    Twin pregnancy, twins dichorionic and diamniotic    Morbid obesity due to excess calories              Contractions that started after cerclage have stopped.  Will D/C mag and continue Abx.  If patient does well, will D/C home tomorrow.    Douglas A. Milligan, MD  10/3/2017 8:42 AM

## 2017-10-03 NOTE — PROGRESS NOTES
"Adult Nutrition  Assessment/PES    Patient Name:  Hoda Alcantar  YOB: 1990  MRN: [id number]  Admit Date:  9/28/2017    Assessment Date:  10/3/2017    Comments:            Reason for Assessment       10/03/17 1337    Reason for Assessment    Reason For Assessment/Visit length of stay;other (comment)   twin pregnancy    Time Spent (min) 15    Obstetrical Diagnosis Pregnancy   admitted at 22w0d with di/di twins    Other diagnosis shortened cervix during 2nd semester, morbid obesity, 22w5d on 10/3/17                Anthropometrics       10/03/17 1338    Anthropometrics    Height 170.2 cm (67\")    Weight 117 kg (258 lb)   admit weight at 22w0d with twins    Ideal Body Weight (IBW)    Ideal Body Weight (IBW), Female 62.26    % Ideal Body Weight 188.35    Body Mass Index (BMI)    BMI (kg/m2) 40.49            Labs/Tests/Procedures/Meds       10/03/17 1338    Labs/Tests/Procedures/Meds    Labs/Tests Review Reviewed                Nutrition Prescription Ordered       10/03/17 1338    Nutrition Prescription PO    Current PO Diet Regular            Evaluation of Received Nutrient/Fluid Intake       10/03/17 1338    PO Evaluation    Number of Days PO Intake Evaluated Insufficient Data            Problem/Interventions:        Problem 1       10/03/17 1338    Nutrition Diagnoses Problem 1    Problem 1 Increased Nutrient Needs    Macronutrient Kcal;Protein    Etiology (related to) Medical Diagnosis    Obstetrical Diagnosis Pregnancy   twin pregnancy    Signs/Symptoms (evidenced by) Other (comment)   Additonal calorie and protein needs are recommended for twin pregnancy                    Intervention Goal       10/03/17 1339    Intervention Goal    General Nutrition support treatment    Weight Appropriate weight gain            Nutrition Intervention       10/03/17 1339    Nutrition Intervention    RD/Tech Action Follow Tx progress;Care plan reviewd              Education/Evaluation       10/03/17 1339    " Monitor/Evaluation    Monitor PO intake;Per protocol;Weight        Electronically signed by:  Natalee So, ARNOLDO  10/03/17 1:39 PM

## 2017-10-03 NOTE — PROGRESS NOTES
10/3/2017  HD:5  27 y.o. yo female  at 22w5d    Subjective   Hoda is stable.  S/P cerclage. No LOF or bleeding.       Objective   Temp: Temp:  [97.8 °F (36.6 °C)-98.6 °F (37 °C)] 98.2 °F (36.8 °C) Temp src: Oral   BP: BP: (102-161)/(52-81) 102/67        Pulse: Heart Rate:  [] 109  RR: Resp:  [16-18] 16    General:  nad   Abdomen: Gravid, nontender         Lab Results   Component Value Date    WBC 14.83 (H) 2017    HGB 11.8 2017    HCT 35.6 2017    MCV 94.7 2017     2017                     Assessment  1.   27 y.o. yo female  at 22w5d  2.   S/P cerclage - hopeful d/c in am.    Plan  1. Cont current hospital care.   2.   D/C per [facility]    This note has been electronically signed.    Jaky Royal MD  October 3, 2017

## 2017-10-04 VITALS
HEART RATE: 90 BPM | RESPIRATION RATE: 16 BRPM | SYSTOLIC BLOOD PRESSURE: 125 MMHG | OXYGEN SATURATION: 100 % | HEIGHT: 67 IN | DIASTOLIC BLOOD PRESSURE: 80 MMHG | BODY MASS INDEX: 40.49 KG/M2 | TEMPERATURE: 97.8 F | WEIGHT: 258 LBS

## 2017-10-04 PROCEDURE — 99238 HOSP IP/OBS DSCHRG MGMT 30/<: CPT | Performed by: OBSTETRICS & GYNECOLOGY

## 2017-10-04 RX ORDER — CEPHALEXIN 500 MG/1
500 CAPSULE ORAL 3 TIMES DAILY
Qty: 15 CAPSULE | Refills: 0 | Status: SHIPPED | OUTPATIENT
Start: 2017-10-04 | End: 2017-10-09

## 2017-10-04 RX ADMIN — AMPICILLIN SODIUM 2 G: 2 INJECTION, POWDER, FOR SOLUTION INTRAMUSCULAR; INTRAVENOUS at 04:19

## 2017-10-04 NOTE — NURSING NOTE
D/C paperwork reviewed with pt. V/U. Denies needs/questions at this time.  V/S stable, alert, oriented, and smiling. W/C ride offered, pt declined. Gait steady.

## 2017-10-04 NOTE — DISCHARGE SUMMARY
Patient Name: Hoda Alcantar  : 1990  MRN: [id number]  Date of Service: 10/4/2017  Referring Provider: Jaky Royal     ID: 27 y.o.  at 22w6d    Admission Diagnosis: Twins [Z38.5]  Short cervix during pregnancy in second trimester [O26.872]    Discharge Diagnosis:   Patient Active Problem List   Diagnosis   • Twin pregnancy, twins dichorionic and diamniotic   • Morbid obesity due to excess calories   • Short cervix during pregnancy in second trimester       Date of Admission: 2017  9:38 AM    Date of Discharge: 10/4/2017    Discharge Condition: Stable    Discharge to: Home    Discharge Medications:    Hoda Alcantar   Home Medication Instructions MANUEL:[id number]    Printed on:10/04/17 0723   Medication Information                      cephalexin (KEFLEX) 500 MG capsule  Take 1 capsule by mouth 3 (Three) Times a Day for 5 days.             folic acid (FOLVITE) 800 MCG tablet  Take 3,200 mcg by mouth Daily. Takes four tablets once daily             Prenatal Vit-Fe Fumarate-FA (PRENATAL ) 27-1 MG tablet tablet  Take  by mouth Daily.                 Discharge Diet: Reg    Discharge Activity: Bed rest with bathroom privledges, vaginal rest    Follow up appointments: 2 weeks with [facility], Dr. Royal as scheduled    Hospital summary:      Hoda was admitted for Short cervix during pregnancy in second trimester.    She did receive a course of  corticosteroids during her admission.      She did receive magnesium sulfate during her admission.      Her obstetric ultrasounds and fetal testing were reassuring during her admission. Patient underwent Orozco cerclage without difficulty.  She was continued on antibiotics post procedure as membranes were visible at the time of cerclage.  She did well postop. Her condition was determined to be appropriate for outpatient management and she was discharged home in stable condition.  She was instructed to follow up in 2 weeks with  [facility] in [place] in 2 weeks.    Douglas A. Milligan, MD  7:23 AM

## 2017-10-17 ENCOUNTER — HOSPITAL ENCOUNTER (OUTPATIENT)
Dept: WOMENS IMAGING | Facility: HOSPITAL | Age: 27
Discharge: HOME | End: 2017-10-17
Attending: OBSTETRICS & GYNECOLOGY | Admitting: OBSTETRICS & GYNECOLOGY

## 2017-10-17 ENCOUNTER — OFFICE VISIT (OUTPATIENT)
Dept: OBSTETRICS AND GYNECOLOGY | Facility: HOSPITAL | Age: 27
End: 2017-10-17

## 2017-10-17 VITALS — WEIGHT: 258 LBS | SYSTOLIC BLOOD PRESSURE: 114 MMHG | BODY MASS INDEX: 40.41 KG/M2 | DIASTOLIC BLOOD PRESSURE: 60 MMHG

## 2017-10-17 DIAGNOSIS — O30.049 TWIN PREGNANCY, TWINS DICHORIONIC AND DIAMNIOTIC: ICD-10-CM

## 2017-10-17 DIAGNOSIS — E66.01 MORBID OBESITY DUE TO EXCESS CALORIES (HCC): ICD-10-CM

## 2017-10-17 DIAGNOSIS — O26.872 SHORT CERVIX DURING PREGNANCY IN SECOND TRIMESTER: Primary | ICD-10-CM

## 2017-10-17 DIAGNOSIS — O30.042 DICHORIONIC DIAMNIOTIC TWIN PREGNANCY IN SECOND TRIMESTER: ICD-10-CM

## 2017-10-17 PROCEDURE — 76816 OB US FOLLOW-UP PER FETUS: CPT | Performed by: OBSTETRICS & GYNECOLOGY

## 2017-10-17 PROCEDURE — 76816 OB US FOLLOW-UP PER FETUS: CPT

## 2017-10-17 NOTE — PROGRESS NOTES
Denies problems. Denies any bleeding, leaking fluid, cramping, contractions or pressure. States is compliant with bedrest. To see Dr. Royal 10/24/2017.

## 2017-10-18 ENCOUNTER — APPOINTMENT (OUTPATIENT)
Dept: WOMENS IMAGING | Facility: HOSPITAL | Age: 27
End: 2017-10-18
Attending: OBSTETRICS & GYNECOLOGY

## 2017-11-14 ENCOUNTER — HOSPITAL ENCOUNTER (OUTPATIENT)
Dept: WOMENS IMAGING | Facility: HOSPITAL | Age: 27
Discharge: HOME | End: 2017-11-14
Attending: OBSTETRICS & GYNECOLOGY | Admitting: OBSTETRICS & GYNECOLOGY

## 2017-11-14 ENCOUNTER — APPOINTMENT (OUTPATIENT)
Dept: LAB | Facility: HOSPITAL | Age: 27
End: 2017-11-14

## 2017-11-14 ENCOUNTER — OFFICE VISIT (OUTPATIENT)
Dept: OBSTETRICS AND GYNECOLOGY | Facility: HOSPITAL | Age: 27
End: 2017-11-14

## 2017-11-14 VITALS — SYSTOLIC BLOOD PRESSURE: 142 MMHG | DIASTOLIC BLOOD PRESSURE: 85 MMHG | BODY MASS INDEX: 41.82 KG/M2 | WEIGHT: 267 LBS

## 2017-11-14 DIAGNOSIS — O30.042 DICHORIONIC DIAMNIOTIC TWIN PREGNANCY IN SECOND TRIMESTER: ICD-10-CM

## 2017-11-14 DIAGNOSIS — O26.872 SHORT CERVIX DURING PREGNANCY IN SECOND TRIMESTER: Primary | ICD-10-CM

## 2017-11-14 DIAGNOSIS — E66.01 MORBID OBESITY DUE TO EXCESS CALORIES (HCC): ICD-10-CM

## 2017-11-14 DIAGNOSIS — E66.01 MORBID OBESITY WITH BMI OF 40.0-44.9, ADULT (HCC): ICD-10-CM

## 2017-11-14 DIAGNOSIS — O26.872 SHORT CERVIX DURING PREGNANCY IN SECOND TRIMESTER: ICD-10-CM

## 2017-11-14 DIAGNOSIS — O30.043 DICHORIONIC DIAMNIOTIC TWIN PREGNANCY IN THIRD TRIMESTER: ICD-10-CM

## 2017-11-14 LAB
ALP SERPL-CCNC: 99 U/L (ref 25–100)
ALT SERPL W P-5'-P-CCNC: 12 U/L (ref 7–40)
AST SERPL-CCNC: 13 U/L (ref 0–33)
BASOPHILS # BLD AUTO: 0.04 10*3/MM3 (ref 0–0.2)
BASOPHILS NFR BLD AUTO: 0.2 % (ref 0–1)
BILIRUB BLD-MCNC: NEGATIVE MG/DL
BILIRUB SERPL-MCNC: 0.3 MG/DL (ref 0.3–1.2)
CLARITY, POC: CLEAR
COLOR UR: NORMAL
CREAT BLD-MCNC: 0.5 MG/DL (ref 0.6–1.3)
DEPRECATED RDW RBC AUTO: 50.4 FL (ref 37–54)
EOSINOPHIL # BLD AUTO: 0.08 10*3/MM3 (ref 0–0.3)
EOSINOPHIL NFR BLD AUTO: 0.5 % (ref 0–3)
ERYTHROCYTE [DISTWIDTH] IN BLOOD BY AUTOMATED COUNT: 14.5 % (ref 11.3–14.5)
GLUCOSE UR STRIP-MCNC: NEGATIVE MG/DL
HCT VFR BLD AUTO: 41 % (ref 34.5–44)
HGB BLD-MCNC: 13.7 G/DL (ref 11.5–15.5)
IMM GRANULOCYTES # BLD: 0.86 10*3/MM3 (ref 0–0.03)
IMM GRANULOCYTES NFR BLD: 5.1 % (ref 0–0.6)
KETONES UR QL: NEGATIVE
LDH SERPL-CCNC: 221 U/L (ref 120–246)
LEUKOCYTE EST, POC: NEGATIVE
LYMPHOCYTES # BLD AUTO: 2.09 10*3/MM3 (ref 0.6–4.8)
LYMPHOCYTES NFR BLD AUTO: 12.5 % (ref 24–44)
MCH RBC QN AUTO: 31.8 PG (ref 27–31)
MCHC RBC AUTO-ENTMCNC: 33.4 G/DL (ref 32–36)
MCV RBC AUTO: 95.1 FL (ref 80–99)
MONOCYTES # BLD AUTO: 1.08 10*3/MM3 (ref 0–1)
MONOCYTES NFR BLD AUTO: 6.5 % (ref 0–12)
NEUTROPHILS # BLD AUTO: 12.56 10*3/MM3 (ref 1.5–8.3)
NEUTROPHILS NFR BLD AUTO: 75.2 % (ref 41–71)
NITRITE UR-MCNC: NEGATIVE MG/ML
PH UR: 6 [PH] (ref 5–8)
PLAT MORPH BLD: NORMAL
PLATELET # BLD AUTO: 265 10*3/MM3 (ref 150–450)
PMV BLD AUTO: 10.8 FL (ref 6–12)
PROT UR STRIP-MCNC: NEGATIVE MG/DL
RBC # BLD AUTO: 4.31 10*6/MM3 (ref 3.89–5.14)
RBC # UR STRIP: NEGATIVE /UL
RBC MORPH BLD: NORMAL
SP GR UR: 1.02 (ref 1–1.03)
URATE SERPL-MCNC: 3.2 MG/DL (ref 3.1–7.8)
UROBILINOGEN UR QL: NORMAL
WBC MORPH BLD: NORMAL
WBC NRBC COR # BLD: 16.71 10*3/MM3 (ref 3.5–10.8)

## 2017-11-14 PROCEDURE — 85025 COMPLETE CBC W/AUTO DIFF WBC: CPT | Performed by: OBSTETRICS & GYNECOLOGY

## 2017-11-14 PROCEDURE — 84075 ASSAY ALKALINE PHOSPHATASE: CPT | Performed by: OBSTETRICS & GYNECOLOGY

## 2017-11-14 PROCEDURE — 84450 TRANSFERASE (AST) (SGOT): CPT | Performed by: OBSTETRICS & GYNECOLOGY

## 2017-11-14 PROCEDURE — 84550 ASSAY OF BLOOD/URIC ACID: CPT | Performed by: OBSTETRICS & GYNECOLOGY

## 2017-11-14 PROCEDURE — 81002 URINALYSIS NONAUTO W/O SCOPE: CPT | Performed by: OBSTETRICS & GYNECOLOGY

## 2017-11-14 PROCEDURE — 76816 OB US FOLLOW-UP PER FETUS: CPT

## 2017-11-14 PROCEDURE — 84460 ALANINE AMINO (ALT) (SGPT): CPT | Performed by: OBSTETRICS & GYNECOLOGY

## 2017-11-14 PROCEDURE — 85007 BL SMEAR W/DIFF WBC COUNT: CPT | Performed by: OBSTETRICS & GYNECOLOGY

## 2017-11-14 PROCEDURE — 82247 BILIRUBIN TOTAL: CPT | Performed by: OBSTETRICS & GYNECOLOGY

## 2017-11-14 PROCEDURE — 83615 LACTATE (LD) (LDH) ENZYME: CPT | Performed by: OBSTETRICS & GYNECOLOGY

## 2017-11-14 PROCEDURE — 36415 COLL VENOUS BLD VENIPUNCTURE: CPT | Performed by: OBSTETRICS & GYNECOLOGY

## 2017-11-14 PROCEDURE — 76816 OB US FOLLOW-UP PER FETUS: CPT | Performed by: OBSTETRICS & GYNECOLOGY

## 2017-11-14 PROCEDURE — 82565 ASSAY OF CREATININE: CPT | Performed by: OBSTETRICS & GYNECOLOGY

## 2017-11-14 NOTE — PROGRESS NOTES
Pt denies all s/s PTL. Denies other problems. Currently on bedrest and compliant.  Next OB appt 11/21/17. Pt denies all s/s preeclampsia.

## 2017-12-12 ENCOUNTER — HOSPITAL ENCOUNTER (OUTPATIENT)
Dept: WOMENS IMAGING | Facility: HOSPITAL | Age: 27
Discharge: HOME | End: 2017-12-12
Attending: OBSTETRICS & GYNECOLOGY | Admitting: OBSTETRICS & GYNECOLOGY

## 2017-12-12 ENCOUNTER — OFFICE VISIT (OUTPATIENT)
Dept: OBSTETRICS AND GYNECOLOGY | Facility: HOSPITAL | Age: 27
End: 2017-12-12

## 2017-12-12 VITALS — WEIGHT: 281 LBS | DIASTOLIC BLOOD PRESSURE: 57 MMHG | BODY MASS INDEX: 44.01 KG/M2 | SYSTOLIC BLOOD PRESSURE: 124 MMHG

## 2017-12-12 DIAGNOSIS — O26.872 SHORT CERVIX DURING PREGNANCY IN SECOND TRIMESTER: ICD-10-CM

## 2017-12-12 DIAGNOSIS — E66.01 MORBID OBESITY WITH BMI OF 40.0-44.9, ADULT (HCC): ICD-10-CM

## 2017-12-12 DIAGNOSIS — O30.043 DICHORIONIC DIAMNIOTIC TWIN PREGNANCY IN THIRD TRIMESTER: ICD-10-CM

## 2017-12-12 DIAGNOSIS — O30.043 DICHORIONIC DIAMNIOTIC TWIN PREGNANCY IN THIRD TRIMESTER: Primary | ICD-10-CM

## 2017-12-12 DIAGNOSIS — N88.3 INCOMPETENCE OF CERVIX: ICD-10-CM

## 2017-12-12 PROCEDURE — 76816 OB US FOLLOW-UP PER FETUS: CPT

## 2017-12-12 PROCEDURE — 76816 OB US FOLLOW-UP PER FETUS: CPT | Performed by: OBSTETRICS & GYNECOLOGY

## 2017-12-12 NOTE — PROGRESS NOTES
"Denies problems. States is no longer on bedrest; was told she could \"start doing more\".  Next OB visit 12/15/2017; started 2x/week  testing with Dr. Royal last week.   "

## 2017-12-26 ENCOUNTER — HOSPITAL ENCOUNTER (OUTPATIENT)
Facility: HOSPITAL | Age: 27
Discharge: HOME | End: 2017-12-26
Attending: OBSTETRICS & GYNECOLOGY | Admitting: OBSTETRICS & GYNECOLOGY

## 2017-12-26 VITALS
TEMPERATURE: 98.5 F | HEART RATE: 109 BPM | BODY MASS INDEX: 43.95 KG/M2 | DIASTOLIC BLOOD PRESSURE: 70 MMHG | WEIGHT: 280 LBS | RESPIRATION RATE: 16 BRPM | SYSTOLIC BLOOD PRESSURE: 143 MMHG | HEIGHT: 67 IN

## 2017-12-26 LAB
ABO GROUP BLD: NORMAL
ALP SERPL-CCNC: 116 U/L (ref 25–100)
ALT SERPL W P-5'-P-CCNC: 25 U/L (ref 7–40)
AST SERPL-CCNC: 20 U/L (ref 0–33)
BILIRUB SERPL-MCNC: 0.4 MG/DL (ref 0.3–1.2)
BLD GP AB SCN SERPL QL: NEGATIVE
CREAT BLD-MCNC: 0.5 MG/DL (ref 0.6–1.3)
CREAT UR-MCNC: 153.4 MG/DL
DEPRECATED RDW RBC AUTO: 51.4 FL (ref 37–54)
ERYTHROCYTE [DISTWIDTH] IN BLOOD BY AUTOMATED COUNT: 15 % (ref 11.3–14.5)
HCT VFR BLD AUTO: 38.3 % (ref 34.5–44)
HGB BLD-MCNC: 12.9 G/DL (ref 11.5–15.5)
LDH SERPL-CCNC: 213 U/L (ref 120–246)
MCH RBC QN AUTO: 31.9 PG (ref 27–31)
MCHC RBC AUTO-ENTMCNC: 33.7 G/DL (ref 32–36)
MCV RBC AUTO: 94.8 FL (ref 80–99)
PLATELET # BLD AUTO: 211 10*3/MM3 (ref 150–450)
PMV BLD AUTO: 11.5 FL (ref 6–12)
PROT UR-MCNC: 25 MG/DL (ref 1–14)
RBC # BLD AUTO: 4.04 10*6/MM3 (ref 3.89–5.14)
RH BLD: POSITIVE
URATE SERPL-MCNC: 4.5 MG/DL (ref 3.1–7.8)
WBC NRBC COR # BLD: 11.44 10*3/MM3 (ref 3.5–10.8)

## 2017-12-26 PROCEDURE — 86900 BLOOD TYPING SEROLOGIC ABO: CPT | Performed by: OBSTETRICS & GYNECOLOGY

## 2017-12-26 PROCEDURE — 82570 ASSAY OF URINE CREATININE: CPT | Performed by: OBSTETRICS & GYNECOLOGY

## 2017-12-26 PROCEDURE — G0463 HOSPITAL OUTPT CLINIC VISIT: HCPCS

## 2017-12-26 PROCEDURE — 82247 BILIRUBIN TOTAL: CPT | Performed by: OBSTETRICS & GYNECOLOGY

## 2017-12-26 PROCEDURE — 85027 COMPLETE CBC AUTOMATED: CPT | Performed by: OBSTETRICS & GYNECOLOGY

## 2017-12-26 PROCEDURE — 84550 ASSAY OF BLOOD/URIC ACID: CPT | Performed by: OBSTETRICS & GYNECOLOGY

## 2017-12-26 PROCEDURE — 84156 ASSAY OF PROTEIN URINE: CPT | Performed by: OBSTETRICS & GYNECOLOGY

## 2017-12-26 PROCEDURE — 84450 TRANSFERASE (AST) (SGOT): CPT | Performed by: OBSTETRICS & GYNECOLOGY

## 2017-12-26 PROCEDURE — 84075 ASSAY ALKALINE PHOSPHATASE: CPT | Performed by: OBSTETRICS & GYNECOLOGY

## 2017-12-26 PROCEDURE — 83615 LACTATE (LD) (LDH) ENZYME: CPT | Performed by: OBSTETRICS & GYNECOLOGY

## 2017-12-26 PROCEDURE — 82565 ASSAY OF CREATININE: CPT | Performed by: OBSTETRICS & GYNECOLOGY

## 2017-12-26 PROCEDURE — 84460 ALANINE AMINO (ALT) (SGPT): CPT | Performed by: OBSTETRICS & GYNECOLOGY

## 2017-12-26 PROCEDURE — 86850 RBC ANTIBODY SCREEN: CPT | Performed by: OBSTETRICS & GYNECOLOGY

## 2017-12-26 PROCEDURE — 86901 BLOOD TYPING SEROLOGIC RH(D): CPT | Performed by: OBSTETRICS & GYNECOLOGY

## 2017-12-26 RX ORDER — DEXTROSE AND SODIUM CHLORIDE 5; .2 G/100ML; G/100ML
125 INJECTION, SOLUTION INTRAVENOUS CONTINUOUS
Status: DISCONTINUED | OUTPATIENT
Start: 2017-12-26 | End: 2017-12-26 | Stop reason: HOSPADM

## 2017-12-26 RX ADMIN — DEXTROSE AND SODIUM CHLORIDE 125 ML/HR: 5; 200 INJECTION, SOLUTION INTRAVENOUS at 19:57

## 2017-12-27 NOTE — H&P
[facility]  Obstetric History and Physical      Subjective     Patient is a 27 y.o. female  currently at 34w5d, who presents with elevated BP's in clinic.  Denies HA, vis changes, RUQ pain.    Her prenatal care is c/b IVF twins, cerclage for shortened cervix and obesity.      The following portions of the patients history were reviewed and updated as appropriate: current medications, allergies, past medical history, past surgical history, past family history, past social history and problem list .       Prenatal Information:  Prenatal Results         Initial Prenatal Labs Ref. Range Date Time   Hemoglobin       Hematocrit       Platelets  265 10*3/mm3 150 - 450 10*3/mm3 17 1608   Rubella IgG       Hepatitis B SAg       Hepatitis C Ab       RPR       ABO  A   17 1132   Rh  Positive   17 1132   Antibody Screen       HIV       Urine Culture       Gonorrhea       Chlamydia       TSH       2nd and 3rd Trimester Ref. Range Date Time   Hemoglobin (repeated)  13.7 g/dL 11.5 - 15.5 g/dL 17 1608   Hematocrit (repeated)  41.0 % 34.5 - 44.0 % 17 1608   GCT       Antibody Screen (repeated)  Negative   17 1132   GTT Fasting       GTT 1 Hr       GTT 2 Hr       GTT 3 Hr       Group B Strep       Drug Screening Ref. Range Date Time   Amphetamine Screen       Barbiturate Screen       Benzodiazepine Screen       Methadone Screen       Phencyclidine Screen       Opiates Screen       THC Screen       Cocaine Screen       Propoxyphene Screen       Buprenorphine Screen       Methamphetamine Screen       Oxycodone Screen       Tryicyclic Antidepressants Screen       Other (Risk screening) Ref. Range Date Time   Varicella IgG       Parvovirus IgG       CMV IgG       Cystic Fibrosis       Hemoglobin electrophoresis       NIPT       MSAFP-4       AFP (for NTD only)              Legend: ^: Historical            View all results for this pregnancy             Past OB History:     Obstetric History        T0      L0     SAB0   TAB0   Ectopic0   Multiple0   Live Births0       # Outcome Date GA Lbr King/2nd Weight Sex Delivery Anes PTL Lv   1 Current               Obstetric Comments   2017: IVF twin pregnancy- fresh cycle, no donors (FOB S/P vasectomy)        Past Medical History: Past Medical History:   Diagnosis Date   • Endometriosis    • Incompetent cervix in pregnancy    • Migraine       Past Surgical History Past Surgical History:   Procedure Laterality Date   • ABDOMINAL SURGERY      age 7   • CERVICAL CERCLAGE N/A 10/2/2017    Procedure: CERVICAL CERCLAGE;  Surgeon: Douglas A Milligan, MD;  Location: [facility] LABOR DELIVERY;  Service:    • INNER EAR SURGERY     • TONSILLECTOMY     • TYMPANOMASTOIDECTOMY     • UTERINE SEPTUM REMOVAL  2017    per Dr. Das      Family History: No family history on file.   Social History:  reports that she has quit smoking. She has never used smokeless tobacco.   reports that she does not drink alcohol.   reports that she does not use illicit drugs.        Review of Systems      Objective     Vital Signs Range for the last 24 hours  Temperature:     Temp Source:     BP:     Pulse:     Respirations:     SPO2:     O2 Amount (l/min):     O2 Devices     Weight:       Physical Examination: General appearance - alert, well appearing, and in no distress  Neck - supple, no significant adenopathy  Abdomen - soft, nontender, nondistended, no masses or organomegaly  Musculoskeletal - no joint tenderness, deformity or swelling  Extremities - peripheral pulses normal, no pedal edema, no clubbing or cyanosis  Skin - normal coloration and turgor, no rashes, no suspicious skin lesions noted    Breech, transverse    BPP 8/8 x 2 in office    Assessment/Plan     Active Problems:    * No active hospital problems. *      Assessment & Plan    Assessment:  1.  Intrauterine pregnancy at 34w5d weeks gestation with reassuring fetal status.    2.  pre-eclampsia workup  3.  Obstetrical  history significant for is non-contributory.  4.  GBS status: No results found for: GBSANTIGEN    Plan:  1. maternal labs  2. Plan of care has been reviewed with patient and   3.  Risks, benefits of treatment plan have been discussed.  4.  All questions have been answered.      Nataliia Mckeon MD  12/26/2017  7:03 PM

## 2017-12-27 NOTE — NURSING NOTE
First 3 blood pressures were taken with incorrect BP cuff on lower arm.  Cuff moved to upper arm and reading now in 130s. Linda informed of this, order received to D/C pt home if 2-3 more normal BPs obtained.

## 2017-12-27 NOTE — DISCHARGE INSTR - LAB
Call your physician with any questions or concerns. Examples would be: headache, decreased fetal movement, right upper quadrant pain, vision changes or any other questions or concerns.

## 2018-01-03 ENCOUNTER — TRANSCRIBE ORDERS (OUTPATIENT)
Dept: LAB | Facility: HOSPITAL | Age: 28
End: 2018-01-03

## 2018-01-03 ENCOUNTER — LAB (OUTPATIENT)
Dept: LAB | Facility: HOSPITAL | Age: 28
End: 2018-01-03

## 2018-01-03 DIAGNOSIS — Z34.83 PRENATAL CARE, SUBSEQUENT PREGNANCY, THIRD TRIMESTER: ICD-10-CM

## 2018-01-03 DIAGNOSIS — Z34.83 PRENATAL CARE, SUBSEQUENT PREGNANCY, THIRD TRIMESTER: Primary | ICD-10-CM

## 2018-01-03 PROCEDURE — 87081 CULTURE SCREEN ONLY: CPT

## 2018-01-06 LAB — BACTERIA SPEC AEROBE CULT: NORMAL

## 2018-01-09 ENCOUNTER — OFFICE VISIT (OUTPATIENT)
Dept: OBSTETRICS AND GYNECOLOGY | Facility: HOSPITAL | Age: 28
End: 2018-01-09

## 2018-01-09 ENCOUNTER — HOSPITAL ENCOUNTER (OUTPATIENT)
Dept: WOMENS IMAGING | Facility: HOSPITAL | Age: 28
Discharge: HOME | End: 2018-01-09
Attending: OBSTETRICS & GYNECOLOGY | Admitting: OBSTETRICS & GYNECOLOGY

## 2018-01-09 VITALS — DIASTOLIC BLOOD PRESSURE: 72 MMHG | BODY MASS INDEX: 46.11 KG/M2 | WEIGHT: 293 LBS | SYSTOLIC BLOOD PRESSURE: 148 MMHG

## 2018-01-09 DIAGNOSIS — O30.043 DICHORIONIC DIAMNIOTIC TWIN PREGNANCY IN THIRD TRIMESTER: ICD-10-CM

## 2018-01-09 DIAGNOSIS — N88.3 INCOMPETENCE OF CERVIX: ICD-10-CM

## 2018-01-09 DIAGNOSIS — O13.3 PREGNANCY-INDUCED HYPERTENSION IN THIRD TRIMESTER: Primary | ICD-10-CM

## 2018-01-09 DIAGNOSIS — O26.872 SHORT CERVIX DURING PREGNANCY IN SECOND TRIMESTER: ICD-10-CM

## 2018-01-09 DIAGNOSIS — E66.01 MORBID OBESITY WITH BMI OF 40.0-44.9, ADULT (HCC): ICD-10-CM

## 2018-01-09 LAB
BILIRUB BLD-MCNC: NEGATIVE MG/DL
CLARITY, POC: CLEAR
COLOR UR: NORMAL
GLUCOSE UR STRIP-MCNC: NEGATIVE MG/DL
KETONES UR QL: NEGATIVE
LEUKOCYTE EST, POC: NEGATIVE
NITRITE UR-MCNC: NEGATIVE MG/ML
PH UR: 6 [PH] (ref 5–8)
PROT UR STRIP-MCNC: NEGATIVE MG/DL
RBC # UR STRIP: NEGATIVE /UL
SP GR UR: 1.01 (ref 1–1.03)
UROBILINOGEN UR QL: NORMAL

## 2018-01-09 PROCEDURE — 76816 OB US FOLLOW-UP PER FETUS: CPT | Performed by: OBSTETRICS & GYNECOLOGY

## 2018-01-09 PROCEDURE — 81002 URINALYSIS NONAUTO W/O SCOPE: CPT | Performed by: OBSTETRICS & GYNECOLOGY

## 2018-01-09 PROCEDURE — 76816 OB US FOLLOW-UP PER FETUS: CPT

## 2018-01-09 NOTE — PROGRESS NOTES
Documentation of the ultrasound findings, images, and interpretations will be available in the patient's ViewPoint report located in the chart review imaging tab in [address].

## 2018-01-09 NOTE — PROGRESS NOTES
Denies s/s of preeclampsia. [facility] [place] has been diagnosed with GHTN and Dr. Royal plans PCS this Friday, 1/12/2018. [facility] [place] had blood work drawn 1/5/2018 but has not heard results; has not done 24 hour urine.

## 2018-01-11 ENCOUNTER — PREP FOR SURGERY (OUTPATIENT)
Dept: OTHER | Facility: HOSPITAL | Age: 28
End: 2018-01-11

## 2018-01-11 ENCOUNTER — APPOINTMENT (OUTPATIENT)
Dept: PREADMISSION TESTING | Facility: HOSPITAL | Age: 28
End: 2018-01-11

## 2018-01-11 VITALS — HEIGHT: 68 IN | BODY MASS INDEX: 44.27 KG/M2 | WEIGHT: 292.11 LBS

## 2018-01-11 DIAGNOSIS — IMO0001 TWINS: Primary | ICD-10-CM

## 2018-01-11 LAB
ABO GROUP BLD: NORMAL
ALP SERPL-CCNC: 151 U/L (ref 25–100)
ALT SERPL W P-5'-P-CCNC: 21 U/L (ref 7–40)
AST SERPL-CCNC: 20 U/L (ref 0–33)
BILIRUB SERPL-MCNC: 0.5 MG/DL (ref 0.3–1.2)
BLD GP AB SCN SERPL QL: NEGATIVE
CREAT BLD-MCNC: 0.6 MG/DL (ref 0.6–1.3)
DEPRECATED RDW RBC AUTO: 50.1 FL (ref 37–54)
ERYTHROCYTE [DISTWIDTH] IN BLOOD BY AUTOMATED COUNT: 14.7 % (ref 11.3–14.5)
HCT VFR BLD AUTO: 40.8 % (ref 34.5–44)
HGB BLD-MCNC: 13.8 G/DL (ref 11.5–15.5)
LDH SERPL-CCNC: 223 U/L (ref 120–246)
MCH RBC QN AUTO: 31.7 PG (ref 27–31)
MCHC RBC AUTO-ENTMCNC: 33.8 G/DL (ref 32–36)
MCV RBC AUTO: 93.8 FL (ref 80–99)
PLATELET # BLD AUTO: 201 10*3/MM3 (ref 150–450)
PMV BLD AUTO: 12 FL (ref 6–12)
RBC # BLD AUTO: 4.35 10*6/MM3 (ref 3.89–5.14)
RH BLD: POSITIVE
URATE SERPL-MCNC: 4.9 MG/DL (ref 3.1–7.8)
WBC NRBC COR # BLD: 10.86 10*3/MM3 (ref 3.5–10.8)

## 2018-01-11 PROCEDURE — 84550 ASSAY OF BLOOD/URIC ACID: CPT | Performed by: OBSTETRICS & GYNECOLOGY

## 2018-01-11 PROCEDURE — 85027 COMPLETE CBC AUTOMATED: CPT | Performed by: OBSTETRICS & GYNECOLOGY

## 2018-01-11 PROCEDURE — 86900 BLOOD TYPING SEROLOGIC ABO: CPT | Performed by: OBSTETRICS & GYNECOLOGY

## 2018-01-11 PROCEDURE — 84450 TRANSFERASE (AST) (SGOT): CPT | Performed by: OBSTETRICS & GYNECOLOGY

## 2018-01-11 PROCEDURE — 86850 RBC ANTIBODY SCREEN: CPT | Performed by: OBSTETRICS & GYNECOLOGY

## 2018-01-11 PROCEDURE — 82247 BILIRUBIN TOTAL: CPT | Performed by: OBSTETRICS & GYNECOLOGY

## 2018-01-11 PROCEDURE — 84075 ASSAY ALKALINE PHOSPHATASE: CPT | Performed by: OBSTETRICS & GYNECOLOGY

## 2018-01-11 PROCEDURE — 82565 ASSAY OF CREATININE: CPT | Performed by: OBSTETRICS & GYNECOLOGY

## 2018-01-11 PROCEDURE — 83615 LACTATE (LD) (LDH) ENZYME: CPT | Performed by: OBSTETRICS & GYNECOLOGY

## 2018-01-11 PROCEDURE — 84460 ALANINE AMINO (ALT) (SGPT): CPT | Performed by: OBSTETRICS & GYNECOLOGY

## 2018-01-11 PROCEDURE — 86901 BLOOD TYPING SEROLOGIC RH(D): CPT | Performed by: OBSTETRICS & GYNECOLOGY

## 2018-01-11 RX ORDER — OXYTOCIN/RINGER'S LACTATE 20/1000 ML
125 PLASTIC BAG, INJECTION (ML) INTRAVENOUS CONTINUOUS PRN
Status: CANCELLED | OUTPATIENT
Start: 2018-01-11 | End: 2018-01-12

## 2018-01-11 RX ORDER — OXYTOCIN/RINGER'S LACTATE 20/1000 ML
999 PLASTIC BAG, INJECTION (ML) INTRAVENOUS ONCE
Status: CANCELLED | OUTPATIENT
Start: 2018-01-11 | End: 2018-01-11

## 2018-01-11 RX ORDER — SODIUM CHLORIDE 0.9 % (FLUSH) 0.9 %
1-10 SYRINGE (ML) INJECTION AS NEEDED
Status: CANCELLED | OUTPATIENT
Start: 2018-01-11

## 2018-01-11 RX ORDER — METHYLERGONOVINE MALEATE 0.2 MG/ML
200 INJECTION INTRAVENOUS ONCE AS NEEDED
Status: CANCELLED | OUTPATIENT
Start: 2018-01-11

## 2018-01-11 RX ORDER — MISOPROSTOL 200 UG/1
800 TABLET ORAL AS NEEDED
Status: CANCELLED | OUTPATIENT
Start: 2018-01-11

## 2018-01-11 RX ORDER — CEFAZOLIN SODIUM 2 G/100ML
2 INJECTION, SOLUTION INTRAVENOUS ONCE
Status: CANCELLED | OUTPATIENT
Start: 2018-01-11 | End: 2018-01-11

## 2018-01-11 RX ORDER — CARBOPROST TROMETHAMINE 250 UG/ML
250 INJECTION, SOLUTION INTRAMUSCULAR AS NEEDED
Status: CANCELLED | OUTPATIENT
Start: 2018-01-11

## 2018-01-11 RX ORDER — LIDOCAINE HYDROCHLORIDE 10 MG/ML
5 INJECTION, SOLUTION INFILTRATION; PERINEURAL AS NEEDED
Status: CANCELLED | OUTPATIENT
Start: 2018-01-11

## 2018-01-11 RX ORDER — SODIUM CHLORIDE, SODIUM LACTATE, POTASSIUM CHLORIDE, CALCIUM CHLORIDE 600; 310; 30; 20 MG/100ML; MG/100ML; MG/100ML; MG/100ML
125 INJECTION, SOLUTION INTRAVENOUS CONTINUOUS
Status: CANCELLED | OUTPATIENT
Start: 2018-01-11

## 2018-01-11 NOTE — DISCHARGE INSTRUCTIONS
Dear Patient,    We are happy that you have chosen [facility] to have your baby.  We hope that by providing you with the following information, your childbirth experience will be a positive one.    What to know before your arrive:     -Do not eat, drink or chew gum after midnight the day before your procedure.    This also includes mints.   -Do not shave any part of your body including abdomen or pelvic are for two    days before your procedure.   -If you are taking a scheduled medication (insulin, blood pressure medicine,   antibiotics) please consult with your physician whether to take on the day of   surgery.   -Remove all jewelry including rings, wedding bands, and piercing before coming   to the hospital.   -Leave important valuables at home.   -Do not wear dark fingernail polish.   -Bring the following with you to the hospital:    -Picture ID and insurance, Medicare or Medicaid cards    -Co-pay/deductible required by insurance (Cash, Check, Credit Card)    -Copy of living will or power  document (if applicable)    -CPAP mask and tubing, not machine (if applicable)    -Skin prep instructions sheet    What to know the day of procedure:     -[facility], take elevator for first floor, exit to the right and  proceed through the doors to outside, follow the covered sidewalk to the  entrance of the [place], follow the hallway and signs to the [facility],  enter the [place] to your right BEFORE entering the 1720 lobby.  Take the  elevators to the 3rd floor ([address] [place]).   -Leave unnecessary items in your vehicle, including your suitcase.  Your support  person or a family member can get it for you after your procedure.   -Check in at the reception desk in the lobby of the 3rd floor ([address] [place]).   -One person may accompany you to the pre-op/recovery area.  Please have  other family members wait in the waiting room.   -An anesthesiologist will meet with your prior to  your procedure.   -After anesthesia has been initiated, one person may accompany you in the  operating room.   -No video cameras are permitted in the operating room; only still cameras,  Please.      What to expect while you are in recovery:     -One person may stay with you while you are in recovery.   -If the baby is stable, he/she may visit to initiate breastfeeding & [facility].

## 2018-01-12 ENCOUNTER — HOSPITAL ENCOUNTER (INPATIENT)
Facility: HOSPITAL | Age: 28
LOS: 4 days | Discharge: HOME | End: 2018-01-16
Attending: OBSTETRICS & GYNECOLOGY | Admitting: OBSTETRICS & GYNECOLOGY

## 2018-01-12 ENCOUNTER — ANESTHESIA (OUTPATIENT)
Dept: LABOR AND DELIVERY | Facility: HOSPITAL | Age: 28
End: 2018-01-12

## 2018-01-12 ENCOUNTER — ANESTHESIA EVENT (OUTPATIENT)
Dept: LABOR AND DELIVERY | Facility: HOSPITAL | Age: 28
End: 2018-01-12

## 2018-01-12 DIAGNOSIS — IMO0001 TWINS: ICD-10-CM

## 2018-01-12 DIAGNOSIS — O30.043 DICHORIONIC DIAMNIOTIC TWIN PREGNANCY IN THIRD TRIMESTER: ICD-10-CM

## 2018-01-12 LAB
ATMOSPHERIC PRESS: ABNORMAL MMHG
BASE EXCESS BLDCOA CALC-SCNC: -2.7 MMOL/L (ref 0–2)
BASE EXCESS BLDCOA CALC-SCNC: -8.3 MMOL/L (ref 0–2)
BASE EXCESS BLDCOV CALC-SCNC: -2.1 MMOL/L (ref 0–2)
BASE EXCESS BLDCOV CALC-SCNC: -2.5 MMOL/L (ref 0–2)
BDY SITE: ABNORMAL
BDY SITE: ABNORMAL
CO2 BLDA-SCNC: 22.2 MMOL/L (ref 22–33)
CO2 BLDA-SCNC: 24.6 MMOL/L (ref 22–33)
CO2 BLDA-SCNC: 25.8 MMOL/L (ref 22–33)
CO2 BLDA-SCNC: 27.6 MMOL/L (ref 22–33)
DEPRECATED RDW RBC AUTO: 49.5 FL (ref 37–54)
ERYTHROCYTE [DISTWIDTH] IN BLOOD BY AUTOMATED COUNT: 14.6 % (ref 11.3–14.5)
HCO3 BLDCOA-SCNC: 20.6 MMOL/L (ref 16.9–20.5)
HCO3 BLDCOA-SCNC: 25.8 MMOL/L (ref 16.9–20.5)
HCO3 BLDCOV-SCNC: 23.3 MMOL/L (ref 18.6–21.4)
HCO3 BLDCOV-SCNC: 24.4 MMOL/L (ref 18.6–21.4)
HCT VFR BLD AUTO: 36 % (ref 34.5–44)
HGB BLD-MCNC: 12 G/DL (ref 11.5–15.5)
HGB BLDA-MCNC: 15.2 G/DL (ref 14–18)
HGB BLDA-MCNC: 15.3 G/DL (ref 14–18)
HGB BLDA-MCNC: 15.3 G/DL (ref 14–18)
HGB BLDA-MCNC: 16 G/DL (ref 14–18)
HOROWITZ INDEX BLD+IHG-RTO: 21 %
MCH RBC QN AUTO: 31.4 PG (ref 27–31)
MCHC RBC AUTO-ENTMCNC: 33.3 G/DL (ref 32–36)
MCV RBC AUTO: 94.2 FL (ref 80–99)
MODALITY: ABNORMAL
PCO2 BLDCOA: 54.3 MMHG (ref 43.3–54.9)
PCO2 BLDCOA: 58.7 MMHG (ref 43.3–54.9)
PCO2 BLDCOV: 43.1 MM HG (ref 30–60)
PCO2 BLDCOV: 46.7 MM HG (ref 30–60)
PH BLDCOA: 7.19 PH UNITS (ref 7.2–7.3)
PH BLDCOA: 7.25 PH UNITS (ref 7.2–7.3)
PH BLDCOV: 7.33 PH UNITS (ref 7.19–7.46)
PH BLDCOV: 7.34 PH UNITS (ref 7.19–7.46)
PLATELET # BLD AUTO: 171 10*3/MM3 (ref 150–450)
PMV BLD AUTO: 11.4 FL (ref 6–12)
PO2 BLDCOA: 11.8 MMHG (ref 11.5–43.3)
PO2 BLDCOA: 11.8 MMHG (ref 11.5–43.3)
PO2 BLDCOV: 19.2 MM HG
PO2 BLDCOV: 28.7 MM HG
RBC # BLD AUTO: 3.82 10*6/MM3 (ref 3.89–5.14)
SAO2 % BLDCOA: 15.3 %
SAO2 % BLDCOA: 42.7 %
SAO2 % BLDCOA: ABNORMAL % (ref 45–75)
SAO2 % BLDCOA: ABNORMAL % (ref 45–75)
SAO2 % BLDCOV: 39.2 %
SAO2 % BLDCOV: 63.7 %
WBC NRBC COR # BLD: 14.03 10*3/MM3 (ref 3.5–10.8)

## 2018-01-12 PROCEDURE — 25010000002 ONDANSETRON PER 1 MG: Performed by: OBSTETRICS & GYNECOLOGY

## 2018-01-12 PROCEDURE — 25010000002 PHENYLEPHRINE PER 1 ML: Performed by: NURSE ANESTHETIST, CERTIFIED REGISTERED

## 2018-01-12 PROCEDURE — 25010000002 ONDANSETRON PER 1 MG: Performed by: NURSE ANESTHETIST, CERTIFIED REGISTERED

## 2018-01-12 PROCEDURE — 3E0M05Z INTRODUCTION OF ADHESION BARRIER INTO PERITONEAL CAVITY, OPEN APPROACH: ICD-10-PCS | Performed by: OBSTETRICS & GYNECOLOGY

## 2018-01-12 PROCEDURE — 82805 BLOOD GASES W/O2 SATURATION: CPT | Performed by: OBSTETRICS & GYNECOLOGY

## 2018-01-12 PROCEDURE — 25010000002 MIDAZOLAM PER 1 MG: Performed by: NURSE ANESTHETIST, CERTIFIED REGISTERED

## 2018-01-12 PROCEDURE — 25010000002 FENTANYL CITRATE (PF) 100 MCG/2ML SOLUTION: Performed by: NURSE ANESTHETIST, CERTIFIED REGISTERED

## 2018-01-12 PROCEDURE — 59025 FETAL NON-STRESS TEST: CPT

## 2018-01-12 PROCEDURE — 85027 COMPLETE CBC AUTOMATED: CPT | Performed by: OBSTETRICS & GYNECOLOGY

## 2018-01-12 PROCEDURE — 25010000003 MORPHINE PER 10 MG: Performed by: NURSE ANESTHETIST, CERTIFIED REGISTERED

## 2018-01-12 PROCEDURE — C1765 ADHESION BARRIER: HCPCS | Performed by: OBSTETRICS & GYNECOLOGY

## 2018-01-12 PROCEDURE — 88307 TISSUE EXAM BY PATHOLOGIST: CPT | Performed by: OBSTETRICS & GYNECOLOGY

## 2018-01-12 RX ORDER — PROMETHAZINE HYDROCHLORIDE 25 MG/ML
12.5 INJECTION, SOLUTION INTRAMUSCULAR; INTRAVENOUS EVERY 4 HOURS PRN
Status: DISCONTINUED | OUTPATIENT
Start: 2018-01-12 | End: 2018-01-16 | Stop reason: HOSPADM

## 2018-01-12 RX ORDER — SODIUM CHLORIDE, SODIUM LACTATE, POTASSIUM CHLORIDE, CALCIUM CHLORIDE 600; 310; 30; 20 MG/100ML; MG/100ML; MG/100ML; MG/100ML
125 INJECTION, SOLUTION INTRAVENOUS CONTINUOUS
Status: DISCONTINUED | OUTPATIENT
Start: 2018-01-12 | End: 2018-01-16 | Stop reason: HOSPADM

## 2018-01-12 RX ORDER — ONDANSETRON 2 MG/ML
4 INJECTION INTRAMUSCULAR; INTRAVENOUS EVERY 6 HOURS PRN
Status: DISCONTINUED | OUTPATIENT
Start: 2018-01-12 | End: 2018-01-16 | Stop reason: HOSPADM

## 2018-01-12 RX ORDER — METHYLERGONOVINE MALEATE 0.2 MG/ML
200 INJECTION INTRAVENOUS ONCE AS NEEDED
Status: DISCONTINUED | OUTPATIENT
Start: 2018-01-12 | End: 2018-01-12 | Stop reason: HOSPADM

## 2018-01-12 RX ORDER — SODIUM CHLORIDE 0.9 % (FLUSH) 0.9 %
1-10 SYRINGE (ML) INJECTION AS NEEDED
Status: DISCONTINUED | OUTPATIENT
Start: 2018-01-12 | End: 2018-01-12 | Stop reason: HOSPADM

## 2018-01-12 RX ORDER — TRISODIUM CITRATE DIHYDRATE AND CITRIC ACID MONOHYDRATE 500; 334 MG/5ML; MG/5ML
30 SOLUTION ORAL ONCE
Status: COMPLETED | OUTPATIENT
Start: 2018-01-12 | End: 2018-01-12

## 2018-01-12 RX ORDER — SIMETHICONE 80 MG
80 TABLET,CHEWABLE ORAL 4 TIMES DAILY PRN
Status: DISCONTINUED | OUTPATIENT
Start: 2018-01-12 | End: 2018-01-16 | Stop reason: HOSPADM

## 2018-01-12 RX ORDER — MISOPROSTOL 200 UG/1
800 TABLET ORAL AS NEEDED
Status: DISCONTINUED | OUTPATIENT
Start: 2018-01-12 | End: 2018-01-12 | Stop reason: HOSPADM

## 2018-01-12 RX ORDER — FENTANYL CITRATE 50 UG/ML
25 INJECTION, SOLUTION INTRAMUSCULAR; INTRAVENOUS
Status: DISCONTINUED | OUTPATIENT
Start: 2018-01-12 | End: 2018-01-12 | Stop reason: HOSPADM

## 2018-01-12 RX ORDER — BUPIVACAINE HYDROCHLORIDE 7.5 MG/ML
INJECTION, SOLUTION EPIDURAL; RETROBULBAR AS NEEDED
Status: DISCONTINUED | OUTPATIENT
Start: 2018-01-12 | End: 2018-01-12 | Stop reason: SURG

## 2018-01-12 RX ORDER — MORPHINE SULFATE 0.5 MG/ML
INJECTION, SOLUTION EPIDURAL; INTRATHECAL; INTRAVENOUS AS NEEDED
Status: DISCONTINUED | OUTPATIENT
Start: 2018-01-12 | End: 2018-01-12 | Stop reason: SURG

## 2018-01-12 RX ORDER — MORPHINE SULFATE 2 MG/ML
2 INJECTION, SOLUTION INTRAMUSCULAR; INTRAVENOUS EVERY 4 HOURS PRN
Status: DISCONTINUED | OUTPATIENT
Start: 2018-01-12 | End: 2018-01-16 | Stop reason: HOSPADM

## 2018-01-12 RX ORDER — OXYTOCIN/RINGER'S LACTATE 20/1000 ML
999 PLASTIC BAG, INJECTION (ML) INTRAVENOUS ONCE
Status: DISCONTINUED | OUTPATIENT
Start: 2018-01-12 | End: 2018-01-16 | Stop reason: HOSPADM

## 2018-01-12 RX ORDER — LIDOCAINE HYDROCHLORIDE 10 MG/ML
5 INJECTION, SOLUTION EPIDURAL; INFILTRATION; INTRACAUDAL; PERINEURAL AS NEEDED
Status: DISCONTINUED | OUTPATIENT
Start: 2018-01-12 | End: 2018-01-12 | Stop reason: HOSPADM

## 2018-01-12 RX ORDER — ONDANSETRON 2 MG/ML
INJECTION INTRAMUSCULAR; INTRAVENOUS AS NEEDED
Status: DISCONTINUED | OUTPATIENT
Start: 2018-01-12 | End: 2018-01-12 | Stop reason: SURG

## 2018-01-12 RX ORDER — DIPHENHYDRAMINE HCL 25 MG
25 CAPSULE ORAL EVERY 4 HOURS PRN
Status: DISCONTINUED | OUTPATIENT
Start: 2018-01-12 | End: 2018-01-16 | Stop reason: HOSPADM

## 2018-01-12 RX ORDER — DIPHENHYDRAMINE HYDROCHLORIDE 50 MG/ML
25 INJECTION INTRAMUSCULAR; INTRAVENOUS EVERY 4 HOURS PRN
Status: DISCONTINUED | OUTPATIENT
Start: 2018-01-12 | End: 2018-01-16 | Stop reason: HOSPADM

## 2018-01-12 RX ORDER — OXYTOCIN/RINGER'S LACTATE 20/1000 ML
125 PLASTIC BAG, INJECTION (ML) INTRAVENOUS CONTINUOUS PRN
Status: DISPENSED | OUTPATIENT
Start: 2018-01-12 | End: 2018-01-13

## 2018-01-12 RX ORDER — FENTANYL CITRATE 50 UG/ML
INJECTION, SOLUTION INTRAMUSCULAR; INTRAVENOUS AS NEEDED
Status: DISCONTINUED | OUTPATIENT
Start: 2018-01-12 | End: 2018-01-12 | Stop reason: SURG

## 2018-01-12 RX ORDER — SODIUM CHLORIDE, SODIUM LACTATE, POTASSIUM CHLORIDE, CALCIUM CHLORIDE 600; 310; 30; 20 MG/100ML; MG/100ML; MG/100ML; MG/100ML
125 INJECTION, SOLUTION INTRAVENOUS CONTINUOUS
Status: DISCONTINUED | OUTPATIENT
Start: 2018-01-12 | End: 2018-01-12

## 2018-01-12 RX ORDER — IBUPROFEN 600 MG/1
600 TABLET ORAL EVERY 6 HOURS PRN
Status: DISCONTINUED | OUTPATIENT
Start: 2018-01-12 | End: 2018-01-16 | Stop reason: HOSPADM

## 2018-01-12 RX ORDER — ONDANSETRON 4 MG/1
4 TABLET, FILM COATED ORAL EVERY 8 HOURS PRN
Status: DISCONTINUED | OUTPATIENT
Start: 2018-01-12 | End: 2018-01-16 | Stop reason: HOSPADM

## 2018-01-12 RX ORDER — OXYCODONE HYDROCHLORIDE AND ACETAMINOPHEN 5; 325 MG/1; MG/1
1 TABLET ORAL EVERY 4 HOURS PRN
Status: DISCONTINUED | OUTPATIENT
Start: 2018-01-12 | End: 2018-01-16 | Stop reason: HOSPADM

## 2018-01-12 RX ORDER — LANOLIN 100 %
OINTMENT (GRAM) TOPICAL
Status: DISCONTINUED | OUTPATIENT
Start: 2018-01-12 | End: 2018-01-16 | Stop reason: HOSPADM

## 2018-01-12 RX ORDER — MIDAZOLAM HYDROCHLORIDE 1 MG/ML
INJECTION INTRAMUSCULAR; INTRAVENOUS AS NEEDED
Status: DISCONTINUED | OUTPATIENT
Start: 2018-01-12 | End: 2018-01-12 | Stop reason: SURG

## 2018-01-12 RX ORDER — DOCUSATE SODIUM 100 MG/1
100 CAPSULE, LIQUID FILLED ORAL 2 TIMES DAILY PRN
Status: DISCONTINUED | OUTPATIENT
Start: 2018-01-12 | End: 2018-01-16 | Stop reason: HOSPADM

## 2018-01-12 RX ORDER — NALOXONE HCL 0.4 MG/ML
0.4 VIAL (ML) INJECTION
Status: ACTIVE | OUTPATIENT
Start: 2018-01-12 | End: 2018-01-13

## 2018-01-12 RX ORDER — ONDANSETRON 2 MG/ML
4 INJECTION INTRAMUSCULAR; INTRAVENOUS ONCE AS NEEDED
Status: DISCONTINUED | OUTPATIENT
Start: 2018-01-12 | End: 2018-01-12 | Stop reason: HOSPADM

## 2018-01-12 RX ORDER — CEFAZOLIN SODIUM 2 G/100ML
2 INJECTION, SOLUTION INTRAVENOUS ONCE
Status: DISCONTINUED | OUTPATIENT
Start: 2018-01-12 | End: 2018-01-12

## 2018-01-12 RX ORDER — NALOXONE HCL 0.4 MG/ML
0.4 VIAL (ML) INJECTION
Status: DISCONTINUED | OUTPATIENT
Start: 2018-01-12 | End: 2018-01-16 | Stop reason: HOSPADM

## 2018-01-12 RX ORDER — CARBOPROST TROMETHAMINE 250 UG/ML
250 INJECTION, SOLUTION INTRAMUSCULAR AS NEEDED
Status: DISCONTINUED | OUTPATIENT
Start: 2018-01-12 | End: 2018-01-12 | Stop reason: HOSPADM

## 2018-01-12 RX ORDER — OXYTOCIN 10 [USP'U]/ML
INJECTION, SOLUTION INTRAMUSCULAR; INTRAVENOUS AS NEEDED
Status: DISCONTINUED | OUTPATIENT
Start: 2018-01-12 | End: 2018-01-12 | Stop reason: SURG

## 2018-01-12 RX ORDER — OXYCODONE AND ACETAMINOPHEN 7.5; 325 MG/1; MG/1
1 TABLET ORAL EVERY 4 HOURS PRN
Status: COMPLETED | OUTPATIENT
Start: 2018-01-12 | End: 2018-01-12

## 2018-01-12 RX ADMIN — SODIUM CHLORIDE, POTASSIUM CHLORIDE, SODIUM LACTATE AND CALCIUM CHLORIDE 1000 ML: 600; 310; 30; 20 INJECTION, SOLUTION INTRAVENOUS at 11:05

## 2018-01-12 RX ADMIN — OXYCODONE HYDROCHLORIDE AND ACETAMINOPHEN 1 TABLET: 7.5; 325 TABLET ORAL at 15:06

## 2018-01-12 RX ADMIN — EPHEDRINE SULFATE 10 MG: 50 INJECTION INTRAMUSCULAR; INTRAVENOUS; SUBCUTANEOUS at 12:42

## 2018-01-12 RX ADMIN — MORPHINE SULFATE 0.15 MG: 0.5 INJECTION, SOLUTION EPIDURAL; INTRATHECAL; INTRAVENOUS at 12:36

## 2018-01-12 RX ADMIN — SODIUM CHLORIDE, POTASSIUM CHLORIDE, SODIUM LACTATE AND CALCIUM CHLORIDE: 600; 310; 30; 20 INJECTION, SOLUTION INTRAVENOUS at 13:03

## 2018-01-12 RX ADMIN — OXYTOCIN 40 UNITS: 10 INJECTION, SOLUTION INTRAMUSCULAR; INTRAVENOUS at 13:03

## 2018-01-12 RX ADMIN — Medication 3 G: at 12:17

## 2018-01-12 RX ADMIN — SODIUM CHLORIDE, POTASSIUM CHLORIDE, SODIUM LACTATE AND CALCIUM CHLORIDE 125 ML/HR: 600; 310; 30; 20 INJECTION, SOLUTION INTRAVENOUS at 19:00

## 2018-01-12 RX ADMIN — SODIUM CHLORIDE, POTASSIUM CHLORIDE, SODIUM LACTATE AND CALCIUM CHLORIDE: 600; 310; 30; 20 INJECTION, SOLUTION INTRAVENOUS at 13:41

## 2018-01-12 RX ADMIN — FENTANYL CITRATE 15 MCG: 50 INJECTION, SOLUTION INTRAMUSCULAR; INTRAVENOUS at 12:36

## 2018-01-12 RX ADMIN — ONDANSETRON 4 MG: 2 INJECTION INTRAMUSCULAR; INTRAVENOUS at 17:59

## 2018-01-12 RX ADMIN — EPHEDRINE SULFATE 5 MG: 50 INJECTION INTRAMUSCULAR; INTRAVENOUS; SUBCUTANEOUS at 12:53

## 2018-01-12 RX ADMIN — SODIUM CHLORIDE, POTASSIUM CHLORIDE, SODIUM LACTATE AND CALCIUM CHLORIDE 125 ML/HR: 600; 310; 30; 20 INJECTION, SOLUTION INTRAVENOUS at 11:45

## 2018-01-12 RX ADMIN — SODIUM CITRATE AND CITRIC ACID MONOHYDRATE 30 ML: 500; 334 SOLUTION ORAL at 12:17

## 2018-01-12 RX ADMIN — MIDAZOLAM HYDROCHLORIDE 1 MG: 1 INJECTION, SOLUTION INTRAMUSCULAR; INTRAVENOUS at 12:28

## 2018-01-12 RX ADMIN — Medication 4 APPLICATION: at 18:00

## 2018-01-12 RX ADMIN — BUPIVACAINE HYDROCHLORIDE 1.5 ML: 7.5 INJECTION, SOLUTION EPIDURAL; RETROBULBAR at 12:36

## 2018-01-12 RX ADMIN — EPHEDRINE SULFATE 10 MG: 50 INJECTION INTRAMUSCULAR; INTRAVENOUS; SUBCUTANEOUS at 12:45

## 2018-01-12 RX ADMIN — PHENYLEPHRINE HYDROCHLORIDE 50 MCG: 10 INJECTION INTRAVENOUS at 12:47

## 2018-01-12 RX ADMIN — OXYTOCIN 20 UNITS: 10 INJECTION, SOLUTION INTRAMUSCULAR; INTRAVENOUS at 13:41

## 2018-01-12 RX ADMIN — ONDANSETRON 4 MG: 2 INJECTION INTRAMUSCULAR; INTRAVENOUS at 12:40

## 2018-01-12 RX ADMIN — SODIUM CHLORIDE, POTASSIUM CHLORIDE, SODIUM LACTATE AND CALCIUM CHLORIDE: 600; 310; 30; 20 INJECTION, SOLUTION INTRAVENOUS at 12:41

## 2018-01-12 RX ADMIN — PHENYLEPHRINE HYDROCHLORIDE 50 MCG: 10 INJECTION INTRAVENOUS at 12:53

## 2018-01-12 NOTE — ANESTHESIA PROCEDURE NOTES
Spinal Block    Indication:procedure for pain  Performed By  Anesthesiologist: KAREL KELLEY  CRNA: SANKET VOSS  Preanesthetic Checklist  Completed: patient identified, surgical consent, pre-op evaluation, timeout performed, IV checked, risks and benefits discussed and monitors and equipment checked  Spinal Block Prep:  Patient Position:sitting  Sterile Tech:cap, gloves, mask and sterile barriers  Prep:Betadine  Patient Monitoring:blood pressure monitoring, continuous pulse oximetry and EKG  Spinal Block Procedure  Approach:midline  Guidance:palpation technique  Location:L2-L3  Needle Type:Dipesh  Needle Gauge:25 G  Placement of Spinal needle event:cerebrospinal fluid aspirated  Paresthesia: no  Fluid Appearance:clear  Post Assessment  Patient Tolerance:patient tolerated the procedure well with no apparent complications  Complications no

## 2018-01-12 NOTE — OP NOTE
Section Procedure Note    Indications: previous  section low transverse      Pre-operative Diagnosis: 1: 37w1d  2.  TWINS  3.  Breech / Breech position  4.  Incompetent cervix with cerclage                Patient Active Problem List   Diagnosis   • Twin pregnancy, twins dichorionic and diamniotic   • Short cervix during pregnancy in second trimester   • Morbid obesity with BMI of 40.0-44.9, adult   • Incompetence of cervix   • Twins                Post-operative Diagnosis: 1:  Same.      Procedures:  Procedure(s):   SECTION PRIMARY IVF TWINS  LTUI    Surgeon:  Jaky Royal MD    Anesthesia:  Spinal    Estimated Blood Loss:  1100 mL    Infant:            Gender:    Matheus Alcantar A [[id number]]   female     Mulu Alcantar [[id number]]   male   infant    Weight:    Matheus Alcantar A [[id number]]   No birth weight on file.     Mulu Alcantar B [[id number]]   No birth weight on file.      Apgars:    Matheus Alcantar A [[id number]]   8      Mulu Alcantar [[id number]]   8    @ 1 minute /        Matheus Alcantar A [[id number]]   9      Mulu Alcantar B [[id number]]   9    @ 5 minutes               Findings:       The infant was delivered from the Presentation/Position:      Matheus Alcantar A [[id number]]   Breech     Mulu Alcantar [[id number]]   Breech  ;      Matheus Alcantar A [[id number]]         Mulu Alcantar [[id number]]            Matheus Alcantar A [[id number]]         Mulu Alcantar [[id number]]            Matheus Alcantar A [[id number]]         Mulu Alcantar B [[id number]]        The amnionic fluid was      Matheus Alcantar A [[id number]]         Mulu Alcantar [[id number]]          Drains:  Cunningham catheter to straight drainage.                 Antibiotics:  cefazolin (Ancef)           Complications:  None; patient tolerated the procedure well.           Disposition: PACU - hemodynamically stable.           Condition: stable    Surgeon:  Jaky Royal MD     Assistants:  Quoc Hernandez     Anesthesia: Spinal anesthesia    Procedure Details   The patient was seen in the Holding Room. The risks, benefits, complications, treatment options, and expected outcomes were discussed with the patient.  The patient concurred with the proposed plan, giving informed consent.  The patient was taken to the Operating Room, identified as Hoda Alcantar and the procedure verified as  Delivery. A Time Out was held and the above information confirmed.    After an adequate level of anesthesia was obtained, the patient was draped and prepped in the usual sterile manner. A Pfannenstiel incision was made and carried down through the subcutaneous tissue to the fascia. Fascial incision was made and extended transversely with the Granado scissors. The fascia was  bluntly and sharply from the underlying rectus tissue superiorly and inferiorly. The rectus muscles were divided sharply. The peritoneum was identified and entered. Peritoneal incision was extended longitudinally taking care not to injure the bladder and bowel.  The bladder flap was sharply and bluntly developed  A low transverse uterine incision was made with the scapel.  Delivered from       Matheus Alcantar  [[id number]]   [facility]     Mulu [facility]  [facility][id number]]   [facility]  .  After the umbilical cords were clamped and cut, cord blood was obtained for evaluation. The placentas were expressed intact and appeared normal. The uterine outline, tubes and ovaries appeared normal.  The uterus was everted from the abdomen and curretted with a moist lap sponge x 2.  The uterine incision was closed in two layers with a running continuous locking suture of #1 chromic.  During hysterotomy closure bleeding from the placental bed was noted with one area of neovascularization requiring sutures.   Hemostasis was obtained.  The remainder of the hysterotomy was closed.  Irrigation was performed and counts were  correct;  The uterus was replaced into the abdomen and the lateral gutters were irrigated.  The Incision was inspected and noted to be hemostatic.  Intercede was placed.  The peritoneum was closed with a running continuous suture of 2-0 Vicryl;  The rectus muscles reaproximated with interrupted sutures of 2-0 Vicryl. The fascia was then reapproximated with running sutures of 0 Vicryl. The subcutaneous layer was irrigated, noted to be hemostatic, and closed with 3-0 plain Gut.  The skin was reapproximated with staples.    Instrument, sponge, and needle counts were correct prior the abdominal closure and at the conclusion of the case. The patient went to the Recovery Room in stable condition with the castro draining clear urine.       Jaky Royal MD      1/12/2018  1:58 PM

## 2018-01-12 NOTE — ANESTHESIA POSTPROCEDURE EVALUATION
Patient: Hoda Alcantar    Procedure Summary     Date Anesthesia Start Anesthesia Stop Room / Location    18 1227 1355 [facility]  [facility]       Procedure Diagnosis Surgeon Provider     SECTION PRIMARY IVF TWINS (N/A Abdomen) No diagnosis on file. MD Lalo Ocampo MD          Anesthesia Type: spinal, ITN  Last vitals  BP   127/66   Temp 97.4   Pulse 117   Resp 16   SpO2 94%     Post Anesthesia Care and Evaluation    Patient location during evaluation: bedside  Patient participation: complete - patient participated  Level of consciousness: awake and alert  Pain score: 0  Pain management: adequate  Airway patency: patent  Anesthetic complications: No anesthetic complications    Cardiovascular status: acceptable  Respiratory status: acceptable  Hydration status: acceptable

## 2018-01-12 NOTE — ANESTHESIA PREPROCEDURE EVALUATION
Anesthesia Evaluation     history of anesthetic complications: PONV  NPO Solid Status: > 8 hours  NPO Liquid Status: > 8 hours     Airway   Mallampati: II  TM distance: >3 FB  Neck ROM: full  Dental      Pulmonary    (+) a smoker Former,   Cardiovascular         Neuro/Psych  (+) headaches (Migraines),     GI/Hepatic/Renal/Endo    (+) morbid obesity,     Musculoskeletal (-) negative ROS    Abdominal    Substance History - negative use     OB/GYN    (+) Pregnant, Preeclampsia,     Comment: Twin gestation      Other - negative ROS         Other Comment: Hearing aid left ear                                    Anesthesia Plan    ASA 3     spinal and ITN     Anesthetic plan and risks discussed with patient.

## 2018-01-12 NOTE — H&P
[facility]  Hoda Alcantar  : 1990  MRN: [id number]  CSN: [id number]    History and Physical    Subjective   Hoda Alcantar is a 27 y.o. year old  with an Estimated Date of Delivery: 18 scheduled for  delivery due to Twin ivf pregnancy, br/br, emeka.  Her placental location is anterior and posterior.  She is not planning for sterilization at the time of the .    Prenatal care has been with Dr. Royal.  It has been complicated by multiple gestation (twins), gest HTN, cevical incompetence s/p cerclage.     Obstetric History       T0      L0     SAB0   TAB0   Ectopic0   Multiple0   Live Births0       # Outcome Date GA Lbr King/2nd Weight Sex Delivery Anes PTL Lv   1 Current               Obstetric Comments   2017: IVF twin pregnancy- fresh cycle, no donors (FOB S/P vasectomy)      Past Medical History:   Diagnosis Date   • Endometriosis    • Gestational hypertension    • Incompetent cervix in pregnancy    • Migraine    • PONV (postoperative nausea and vomiting)     GAS SEDATION WAKES HER UP    • Wears contact lenses    • Wears hearing aid     LEFT EAR, DOESN'T TYPICALLY WEAR IT     Past Surgical History:   Procedure Laterality Date   • ABDOMINAL SURGERY      age 7   • CERVICAL CERCLAGE N/A 10/2/2017    Procedure: CERVICAL CERCLAGE;  Surgeon: Douglas A Milligan, MD;  Location: [facility] DELIVERY;  Service:    • INNER EAR SURGERY     • TONSILLECTOMY     • TYMPANOMASTOIDECTOMY     • UTERINE SEPTUM REMOVAL  2017    per Dr. Das       Current Facility-Administered Medications:   •  ceFAZolin (ANCEF) in SWFI 3 g/20mL IV PUSH syringe, 3 g, Intravenous, Once, Jaky Royal MD  •  lactated ringers infusion, 125 mL/hr, Intravenous, Continuous, Jaky Royal MD, Last Rate: 125 mL/hr at 18 1145, 125 mL/hr at 18 1145  •  lidocaine PF 1% (XYLOCAINE) injection 5 mL, 5 mL, Intradermal, PRN, Jaky Royal MD  •  Sod Citrate-Citric Acid (BICITRA) solution 30 mL, 30 mL,  "Oral, Once, Jaky Royal MD  •  sodium chloride 0.9 % flush 1-10 mL, 1-10 mL, Intravenous, PRN, Jaky Royal MD    No Known Allergies    Smoking status: Former Smoker                                                              Packs/day: 0.00      Years: 0.00         Types: Cigarettes     Quit date:   Smokeless status: Never Used                        Review of Systems      Objective   /92  Pulse 96  Temp 98.3 °F (36.8 °C) (Oral)   Resp 16  Ht 170.2 cm (67\")  Wt 133 kg (293 lb)  LMP 2017  BMI 45.89 kg/m2  General: well developed; well nourished  no acute distress  appears stated age  obese - Body mass index is 45.89 kg/(m^2).   Heart: regular rate and rhythm   Lungs: breathing is unlabored   Abdomen: soft, non-tender; no masses  scar at the LUQ from prior surgery. Gravid uterus.     Prenatal Labs  Lab Results   Component Value Date    HGB 13.8 2018    ABSCRN Negative 2018    STREPGPB  2018     No Group B Streptococcus Isolated from Broth Culture       Recent Labs  Lab Results   Component Value Date    HGB 13.8 2018    HCT 40.8 2018    WBC 10.86 (H) 2018     2018           Assessment   1. IUP with an Estimated Date of Delivery: 18  2. Planned  section for IVF twin, malpresentation: breech/breech     Plan   1. Primary   2. ABx and DVT prophylaxis    Quoc Hernandez MD  2018       "

## 2018-01-12 NOTE — PLAN OF CARE
Problem: Patient Care Overview (Adult)  Goal: Plan of Care Review  Outcome: Ongoing (interventions implemented as appropriate)   18 1411   Coping/Psychosocial Response Interventions   Plan Of Care Reviewed With patient       Problem:  Delivery (Adult,Obstetrics,Pediatric)  Goal: Signs and Symptoms of Listed Potential Problems Will be Absent or Manageable ( Delivery)  Outcome: Outcome(s) achieved Date Met: 18 1411    Delivery   Problems Assessed ( Delivery) all   Problems Present ( Delivery) none

## 2018-01-13 LAB
BASOPHILS # BLD AUTO: 0.02 10*3/MM3 (ref 0–0.2)
BASOPHILS NFR BLD AUTO: 0.2 % (ref 0–1)
DEPRECATED RDW RBC AUTO: 50.4 FL (ref 37–54)
EOSINOPHIL # BLD AUTO: 0.09 10*3/MM3 (ref 0–0.3)
EOSINOPHIL NFR BLD AUTO: 0.8 % (ref 0–3)
ERYTHROCYTE [DISTWIDTH] IN BLOOD BY AUTOMATED COUNT: 14.7 % (ref 11.3–14.5)
HCT VFR BLD AUTO: 37 % (ref 34.5–44)
HGB BLD-MCNC: 12.2 G/DL (ref 11.5–15.5)
IMM GRANULOCYTES # BLD: 0.08 10*3/MM3 (ref 0–0.03)
IMM GRANULOCYTES NFR BLD: 0.7 % (ref 0–0.6)
LYMPHOCYTES # BLD AUTO: 0.92 10*3/MM3 (ref 0.6–4.8)
LYMPHOCYTES NFR BLD AUTO: 7.7 % (ref 24–44)
MCH RBC QN AUTO: 31 PG (ref 27–31)
MCHC RBC AUTO-ENTMCNC: 33 G/DL (ref 32–36)
MCV RBC AUTO: 94.1 FL (ref 80–99)
MONOCYTES # BLD AUTO: 0.97 10*3/MM3 (ref 0–1)
MONOCYTES NFR BLD AUTO: 8.1 % (ref 0–12)
NEUTROPHILS # BLD AUTO: 9.89 10*3/MM3 (ref 1.5–8.3)
NEUTROPHILS NFR BLD AUTO: 82.5 % (ref 41–71)
PLATELET # BLD AUTO: 182 10*3/MM3 (ref 150–450)
PMV BLD AUTO: 11.6 FL (ref 6–12)
RBC # BLD AUTO: 3.93 10*6/MM3 (ref 3.89–5.14)
WBC NRBC COR # BLD: 11.97 10*3/MM3 (ref 3.5–10.8)

## 2018-01-13 PROCEDURE — 85025 COMPLETE CBC W/AUTO DIFF WBC: CPT | Performed by: OBSTETRICS & GYNECOLOGY

## 2018-01-13 RX ADMIN — OXYCODONE AND ACETAMINOPHEN 1 TABLET: 5; 325 TABLET ORAL at 14:09

## 2018-01-13 RX ADMIN — SIMETHICONE CHEW TAB 80 MG 80 MG: 80 TABLET ORAL at 14:09

## 2018-01-13 RX ADMIN — IBUPROFEN 600 MG: 600 TABLET, FILM COATED ORAL at 07:08

## 2018-01-13 RX ADMIN — SIMETHICONE CHEW TAB 80 MG 80 MG: 80 TABLET ORAL at 18:22

## 2018-01-13 RX ADMIN — OXYCODONE AND ACETAMINOPHEN 1 TABLET: 5; 325 TABLET ORAL at 07:09

## 2018-01-13 RX ADMIN — IBUPROFEN 600 MG: 600 TABLET, FILM COATED ORAL at 22:33

## 2018-01-13 RX ADMIN — OXYCODONE AND ACETAMINOPHEN 1 TABLET: 5; 325 TABLET ORAL at 22:33

## 2018-01-13 RX ADMIN — DOCUSATE SODIUM 100 MG: 100 CAPSULE, LIQUID FILLED ORAL at 10:16

## 2018-01-13 RX ADMIN — DOCUSATE SODIUM 100 MG: 100 CAPSULE, LIQUID FILLED ORAL at 18:22

## 2018-01-13 RX ADMIN — SIMETHICONE CHEW TAB 80 MG 80 MG: 80 TABLET ORAL at 22:33

## 2018-01-13 RX ADMIN — IBUPROFEN 600 MG: 600 TABLET, FILM COATED ORAL at 14:09

## 2018-01-13 RX ADMIN — SIMETHICONE CHEW TAB 80 MG 80 MG: 80 TABLET ORAL at 10:16

## 2018-01-13 RX ADMIN — OXYCODONE AND ACETAMINOPHEN 1 TABLET: 5; 325 TABLET ORAL at 18:22

## 2018-01-13 NOTE — PROGRESS NOTES
2018    Name:Hoda Alcantar    MR#:[id number]     PROGRESS NOTE:  Post-Op 1 S/P    HD:1    Subjective   27 y.o. yo Female  s/p CS at 37w2d doing well. Pain well controlled. Tolerating regular diet and having flatus. Lochia normal.     Patient Active Problem List   Diagnosis   • Twin pregnancy, twins dichorionic and diamniotic   • Short cervix during pregnancy in second trimester   • Morbid obesity with BMI of 40.0-44.9, adult   • Incompetence of cervix   • Twins        Objective    Vitals  Temp:  Temp:  [97.4 °F (36.3 °C)-98.9 °F (37.2 °C)] 98.4 °F (36.9 °C)  Temp src: Oral  BP:  BP: (114-144)/(59-92) 126/59  Pulse:  Heart Rate:  [] 107  RR:   Resp:  [14-18] 14    General Awake, alert, no distress  Abdomen Soft, non-distended, fundus firm, below umbilicus, appropriately tender  Incision  Intact, no erythema or exudate  Extremities Calves NT bilaterally     I/O last 3 completed shifts:  In: 3300 [I.V.:2300; IV Piggyback:1000]  Out: 2925 [Urine:1825; Blood:1100]    LABS:   Lab Results   Component Value Date    WBC 11.97 (H) 2018    HGB 12.2 2018    HCT 37.0 2018    MCV 94.1 2018     2018       Infant:      Bethany AlcantarPaula HOPE [[id number]]   female     Mulu Alcantar [[id number]]   male        Assessment   1.  POD 1    Plan: Doing well.  Routine postoperative care      Active Problems:   None      Bhargavi Schumacher, APRN  2018 10:42 AM

## 2018-01-13 NOTE — ANESTHESIA POSTPROCEDURE EVALUATION
Patient: Hoda Alcantar    Procedure Summary     Date Anesthesia Start Anesthesia Stop Room / Location    18 1227 1355 [facility] / [facility]       Procedure Diagnosis Surgeon Provider     SECTION PRIMARY IVF TWINS (N/A Abdomen) No diagnosis on file. MD Lalo Ocampo MD          Anesthesia Type: spinal, ITN  Last vitals  BP   137/71 (18 1100)   Temp   98.4 °F (36.9 °C) (18 1100)   Pulse   104 (18 1100)   Resp   16 (18 1100)     SpO2   96 % (18 1455)     Post Anesthesia Care and Evaluation    Patient location during evaluation: bedside  Patient participation: complete - patient participated  Level of consciousness: awake  Pain score: 0  Pain management: satisfactory to patient  Airway patency: patent  Anesthetic complications: No anesthetic complications    Cardiovascular status: acceptable  Respiratory status: acceptable  Hydration status: acceptable

## 2018-01-13 NOTE — PLAN OF CARE
Problem: Patient Care Overview (Adult)  Goal: Plan of Care Review  Outcome: Ongoing (interventions implemented as appropriate)   18 1901   Coping/Psychosocial Response Interventions   Plan Of Care Reviewed With patient   Patient Care Overview   Progress improving     Goal: Adult Individualization and Mutuality  Outcome: Ongoing (interventions implemented as appropriate)    Goal: Discharge Needs Assessment  Outcome: Ongoing (interventions implemented as appropriate)      Problem: Postpartum ( Delivery) (Adult)  Goal: Signs and Symptoms of Listed Potential Problems Will be Absent or Manageable (Postpartum)  Outcome: Ongoing (interventions implemented as appropriate)      Problem: Breastfeeding (Adult,NICU,,Obstetrics,Pediatric)  Goal: Signs and Symptoms of Listed Potential Problems Will be Absent or Manageable (Breastfeeding)  Outcome: Ongoing (interventions implemented as appropriate)

## 2018-01-14 RX ADMIN — IBUPROFEN 600 MG: 600 TABLET, FILM COATED ORAL at 06:23

## 2018-01-14 RX ADMIN — SIMETHICONE CHEW TAB 80 MG 80 MG: 80 TABLET ORAL at 23:42

## 2018-01-14 RX ADMIN — SIMETHICONE CHEW TAB 80 MG 80 MG: 80 TABLET ORAL at 17:42

## 2018-01-14 RX ADMIN — OXYCODONE AND ACETAMINOPHEN 1 TABLET: 5; 325 TABLET ORAL at 23:42

## 2018-01-14 RX ADMIN — OXYCODONE AND ACETAMINOPHEN 1 TABLET: 5; 325 TABLET ORAL at 06:23

## 2018-01-14 RX ADMIN — IBUPROFEN 600 MG: 600 TABLET, FILM COATED ORAL at 11:42

## 2018-01-14 RX ADMIN — OXYCODONE AND ACETAMINOPHEN 1 TABLET: 5; 325 TABLET ORAL at 17:42

## 2018-01-14 RX ADMIN — IBUPROFEN 600 MG: 600 TABLET, FILM COATED ORAL at 23:42

## 2018-01-14 RX ADMIN — POLYETHYLENE GLYCOL 3350 17 G: 17 POWDER, FOR SOLUTION ORAL at 09:28

## 2018-01-14 RX ADMIN — DOCUSATE SODIUM 100 MG: 100 CAPSULE, LIQUID FILLED ORAL at 09:27

## 2018-01-14 RX ADMIN — DOCUSATE SODIUM 100 MG: 100 CAPSULE, LIQUID FILLED ORAL at 17:41

## 2018-01-14 RX ADMIN — IBUPROFEN 600 MG: 600 TABLET, FILM COATED ORAL at 17:42

## 2018-01-14 RX ADMIN — SIMETHICONE CHEW TAB 80 MG 80 MG: 80 TABLET ORAL at 11:42

## 2018-01-14 RX ADMIN — SIMETHICONE CHEW TAB 80 MG 80 MG: 80 TABLET ORAL at 09:28

## 2018-01-14 RX ADMIN — OXYCODONE AND ACETAMINOPHEN 1 TABLET: 5; 325 TABLET ORAL at 11:42

## 2018-01-14 NOTE — PLAN OF CARE
Problem: Breastfeeding (Adult,NICU,,Obstetrics,Pediatric)  Goal: Signs and Symptoms of Listed Potential Problems Will be Absent or Manageable (Breastfeeding)  Outcome: Ongoing (interventions implemented as appropriate)   18 1041   Breastfeeding   Problems Assessed (Breastfeeding) all   Problems Present (Breastfeeding) none

## 2018-01-14 NOTE — PROGRESS NOTES
[facility]   PROGRESS NOTE    Post-Op Day 2 S/P   Subjective     Patient reports:  Pain is well controlled with none.  She is  ambulating. Tolerating diet. Tolerating po -- normal.  Intake -- c/o of tolerating po solids.   Voiding - without difficulty; flatus reported..  Vaginal bleeding is as much as expected.      Objective      Vitals: Vital Signs Range for the last 24 hours  Temperature: Temp:  [97.8 °F (36.6 °C)-98.3 °F (36.8 °C)] 98.1 °F (36.7 °C)   Temp Source: Temp src: Oral   BP: BP: (126-135)/(60-73) 126/65   Pulse: Heart Rate:  [] 103   Respirations: Resp:  [16-20] 18   SPO2:     O2 Amount (l/min):     O2 Devices     Weight:              Physical Exam    Lungs clear to auscultation bilaterally   Abdomen Skin: rash lower legs   Incision  healing well   Extremities extremities normal, atraumatic, no cyanosis or edema     I reviewed the patient's new clinical results.    Assessment/Plan      Active Problems:    Twins      Assessment:    Hoda Alcantar is Day 2  post-partum       Matheus Alcantar [[id number]]   , Low Transverse     Mulu Alcantar [[id number]]   , Low Transverse        Matheus Alcantar [[id number]]         Mulu Alcantar [[id number]]      .       Plan:  continue post op care.Rash without sx and she will watch        Pratik Day MD  2018  12:30 PM

## 2018-01-14 NOTE — PLAN OF CARE
Problem: Patient Care Overview (Adult)  Goal: Plan of Care Review  Outcome: Ongoing (interventions implemented as appropriate)   01/14/18 0611   Coping/Psychosocial Response Interventions   Plan Of Care Reviewed With patient   Patient Care Overview   Progress improving

## 2018-01-14 NOTE — LACTATION NOTE
This note was copied from a baby's chart.     18 1040   Maternal Information   Date of Referral 18   Person Making Referral other (see comments)  (courtesy)   Maternal Reason for Referral breastfeeding currently;multiple births   Equipment Type/Education   Breast Pump Type double electric, personal  (devon KAMINSKI)    Breastfeeding   Breast Pumping Interventions frequent pumping encouraged;post-feed pumping encouraged

## 2018-01-14 NOTE — PLAN OF CARE
Problem: Patient Care Overview (Adult)  Goal: Plan of Care Review  Outcome: Ongoing (interventions implemented as appropriate)   01/14/18 1042   Coping/Psychosocial Response Interventions   Plan Of Care Reviewed With patient   Patient Care Overview   Progress improving

## 2018-01-14 NOTE — LACTATION NOTE
This note was copied from a baby's chart.  Mother currently breastfeeding both infants every 3 hours and offering formula supplement. Discussed pumping after breastfeeding and alternative feeding plans so she can get some rest between feedings.

## 2018-01-14 NOTE — PLAN OF CARE
Problem: Patient Care Overview (Adult)  Goal: Adult Individualization and Mutuality  Outcome: Ongoing (interventions implemented as appropriate)   01/13/18 0215   Individualization   Patient Specific Preferences breast and bottle feed   Patient Specific Goals pain control   Patient Specific Interventions medicate as needed

## 2018-01-14 NOTE — PLAN OF CARE
Problem: Postpartum ( Delivery) (Adult)  Goal: Signs and Symptoms of Listed Potential Problems Will be Absent or Manageable (Postpartum)  Outcome: Ongoing (interventions implemented as appropriate)   18 1041   Postpartum ( Delivery)   Problems Assessed (Postpartum ) all   Problems Present (Postpartum ) none

## 2018-01-14 NOTE — PLAN OF CARE
Problem: Patient Care Overview (Adult)  Goal: Plan of Care Review  Outcome: Ongoing (interventions implemented as appropriate)   18 1040   Coping/Psychosocial Response Interventions   Plan Of Care Reviewed With patient;spouse   Outcome Evaluation   Outcome Summary/Follow up Plan BF twins teaching done, supplemental pumping reviewed as well as cleaning pump parts.        Problem: Breastfeeding (Adult,NICU,,Obstetrics,Pediatric)  Intervention: Promote Successful Breastfeeding Experience   18 1040   Nutrition Interventions   Breastfeeding Assistance both breasts offered each feeding;milk expression/pumping;electric breast pump used;support offered;supplemental feeding provided   Maternal Breastfeeding Support encouragement offered;lactation counseling provided;maternal rest encouraged       Goal: Signs and Symptoms of Listed Potential Problems Will be Absent or Manageable (Breastfeeding)  Outcome: Ongoing (interventions implemented as appropriate)   18 1040   Breastfeeding   Problems Assessed (Breastfeeding) all   Problems Present (Breastfeeding) none

## 2018-01-15 RX ADMIN — IBUPROFEN 600 MG: 600 TABLET, FILM COATED ORAL at 05:32

## 2018-01-15 RX ADMIN — OXYCODONE AND ACETAMINOPHEN 1 TABLET: 5; 325 TABLET ORAL at 05:32

## 2018-01-15 RX ADMIN — IBUPROFEN 600 MG: 600 TABLET, FILM COATED ORAL at 15:23

## 2018-01-15 RX ADMIN — OXYCODONE AND ACETAMINOPHEN 1 TABLET: 5; 325 TABLET ORAL at 15:23

## 2018-01-15 NOTE — LACTATION NOTE
This note was copied from a baby's chart.  Mom reports nursing is going well.  No needs at this time.

## 2018-01-15 NOTE — PROGRESS NOTES
1/15/2018    Name:Hoda Alcantar    MR#:[id number]     PROGRESS NOTE:  Post-Op 3 S/P    HD:3    Subjective   27 y.o. yo Female  s/p CS at 37w4d doing well. Pain well controlled. Tolerating regular diet and having flatus. Lochia normal.     Patient Active Problem List   Diagnosis   • Twin pregnancy, twins dichorionic and diamniotic   • Short cervix during pregnancy in second trimester   • Morbid obesity with BMI of 40.0-44.9, adult   • Incompetence of cervix   • Twins        Objective    Vitals  Temp:  Temp:  [97.5 °F (36.4 °C)-98.2 °F (36.8 °C)] 97.5 °F (36.4 °C)  Temp src: Oral  BP:  BP: (130-146)/(76-86) 130/76  Pulse:  Heart Rate:  [] 91  RR:   Resp:  [16-18] 16    General Awake, alert, no distress  Abdomen Soft, non-distended, fundus firm, below umbilicus, appropriately tender  Incision  Staples Intact, no erythema or exudate  Extremities Calves NT bilaterally     I/O last 3 completed shifts:  In: -   Out: 600 [Urine:600]    LABS:   Lab Results   Component Value Date    WBC 11.97 (H) 2018    HGB 12.2 2018    HCT 37.0 2018    MCV 94.1 2018     2018       Infant:      Matheus Alcantar [[id number]]   female     Mulu Alcantar [[id number]]   male    Both babies doing well, have been in room with mother.      Assessment   1.  POD 3    Plan: Doing well.  Routine postoperative care. Add interdry to incision. Plan for home tomorrow.       Active Problems:   None      Lucy Merlos, APRKENDAL  1/15/2018 9:20 AM

## 2018-01-15 NOTE — LACTATION NOTE
"This note was copied from a baby's chart.     18 1600   Maternal Information   Date of Referral 18   Maternal Reason for Referral breastfeeding currently;multiple births   Maternal Medical Surgical History   Infertility Procedure in vitro fertilization   Prenatal History   Breast Changes enlargement   Maternal Infant Assessment   Size Issue, Bilateral Breasts no   Infant Assessment   Sucking Reflex present   Rooting Reflex present   Swallow Reflex present   LATCH Score   Latch 1-->repeated attempts, holds nipple in mouth, stimulate to suck   Audible Swallowing 1-->a few with stimulation   Type Of Nipple 2-->everted (after stimulation)   Comfort (Breast/Nipple) 1-->filling, red/small blisters/bruises, mild/mod discomfort   Hold (Positioning) 1-->minimal assist, teach one side: mother does other, staff holds   Score (less than 7 for 2/more consecutive times, consult Lactation Consultant) 6   Maternal Infant Feeding   Maternal Emotional State relaxed   Previous Breastfeeding History no   Infant Positioning clutch/\"football\"   Feeding Infant   Effective Latch During Feeding yes   Audible Swallow yes   Suck/Swallow Coordination present   Equipment Type/Education   Breast Pump Type double electric, personal    Breastfeeding   Breast Pumping Interventions frequent pumping encouraged;post-feed pumping encouraged     "

## 2018-01-15 NOTE — LACTATION NOTE
This note was copied from a baby's chart.     18 7253   Maternal Information   Date of Referral 18   Maternal Reason for Referral breastfeeding currently;multiple births   Maternal Medical Surgical History   Infertility Procedure in vitro fertilization   Prenatal History   Breast Changes enlargement   Maternal Infant Assessment   Size Issue, Bilateral Breasts no   Density, Bilateral Breasts soft   Nipples, Bilateral graspable   Nipple Conditions, Bilateral intact;tender   Infant Assessment   Sucking Reflex present   Rooting Reflex present   Swallow Reflex present   LATCH Score   Latch 1-->repeated attempts, holds nipple in mouth, stimulate to suck   Audible Swallowing 1-->a few with stimulation   Type Of Nipple 2-->everted (after stimulation)   Comfort (Breast/Nipple) 1-->filling, red/small blisters/bruises, mild/mod discomfort   Hold (Positioning) 1-->minimal assist, teach one side: mother does other, staff holds   Score (less than 7 for 2/more consecutive times, consult Lactation Consultant) 6   Maternal Infant Feeding   Maternal Emotional State relaxed   Previous Breastfeeding History no   Feeding Infant   Effective Latch During Feeding yes   Skin-to-Skin Contact During Feeding yes   Equipment Type/Education   Breast Pump Type double electric, personal    Breastfeeding   Breast Pumping Interventions post-feed pumping encouraged;frequent pumping encouraged

## 2018-01-16 VITALS
SYSTOLIC BLOOD PRESSURE: 138 MMHG | DIASTOLIC BLOOD PRESSURE: 70 MMHG | HEART RATE: 101 BPM | OXYGEN SATURATION: 96 % | WEIGHT: 293 LBS | TEMPERATURE: 97.7 F | BODY MASS INDEX: 45.99 KG/M2 | RESPIRATION RATE: 16 BRPM | HEIGHT: 67 IN

## 2018-01-16 RX ORDER — IBUPROFEN 600 MG/1
600 TABLET ORAL EVERY 6 HOURS PRN
Qty: 30 TABLET | Refills: 0 | Status: SHIPPED | OUTPATIENT
Start: 2018-01-16

## 2018-01-16 RX ORDER — OXYCODONE HYDROCHLORIDE AND ACETAMINOPHEN 5; 325 MG/1; MG/1
1-2 TABLET ORAL EVERY 4 HOURS PRN
Qty: 20 TABLET | Refills: 0 | Status: SHIPPED | OUTPATIENT
Start: 2018-01-16 | End: 2018-01-19

## 2018-01-16 RX ADMIN — DOCUSATE SODIUM 100 MG: 100 CAPSULE, LIQUID FILLED ORAL at 08:13

## 2018-01-16 RX ADMIN — IBUPROFEN 600 MG: 600 TABLET, FILM COATED ORAL at 02:53

## 2018-01-16 RX ADMIN — OXYCODONE AND ACETAMINOPHEN 1 TABLET: 5; 325 TABLET ORAL at 02:53

## 2018-01-16 NOTE — DISCHARGE SUMMARY
Discharge Summary    Date of Admission: 2018  Date of Discharge:  2018      Patient: Hoda Alcantar      MR#:[id number]    Primary Surgeon/OB: Jaky Royal MD      Presenting Problem/History of Present Illness  Twins [Z38.5]     Patient Active Problem List   Diagnosis   • Twin pregnancy, twins dichorionic and diamniotic   • Short cervix during pregnancy in second trimester   • Morbid obesity with BMI of 40.0-44.9, adult   • Incompetence of cervix   • Twins         Discharge Diagnosis:  section at 37w5d    Procedures:     Matheus Alcantar [[id number]]   , Low Transverse     Mulu Alcantar [[id number]]   , Low Transverse         Matheus Alcantar [[id number]]   2018     Mulu Alcantar [[id number]]   2018        Matheus Alcantar [[id number]]   12:59 PM     Bethany AlcantarTayosunil COTE [[id number]]   1:01 PM           Discharge Date: 2018; Discharge Time: 9:16 AM      Hospital Course  Patient is a 27 y.o. female  at 37w5d status post  section with uneventful postoperative recovery.  Patient was advanced to regular diet on postoperative day#1.  On discharge, ambulating, tolerating a regular diet without any difficulties and her incision is dry, clean and intact.     Infant:        Matheus Alcantar [[id number]]   female     Mulu Alcantar [[id number]]   male   fetus      Matheus Alcantar [[id number]]   3800 g (8 lb 6 oz)     Mulu Alcantar [[id number]]   3066 g (6 lb 12.2 oz)   with Apgar scores of      Matheus Alcantar [[id number]]   8      Bethany AlcantarTayosunil COTE [[id number]]   8   ,      Matheus Alcantar [[id number]]   9      Mulu Alcantar [[id number]]   9    at five minutes.    Condition on Discharge:  Stable    Vital Signs  Temp:  [97.7 °F (36.5 °C)-98.1 °F (36.7 °C)] 97.7 °F (36.5 °C)  Heart Rate:  [] 101  Resp:  [16-18] 16  BP: (137-142)/(70-83) 138/70    Lab Results   Component Value Date    WBC 11.97 (H) 2018     HGB 12.2 01/13/2018    HCT 37.0 01/13/2018    MCV 94.1 01/13/2018     01/13/2018       Discharge Disposition  Home or Self Care    Discharge Medications   Hoda Alcantar   Home Medication Instructions MANUEL:[id number]    Printed on:01/16/18 0915   Medication Information                      ibuprofen (ADVIL,MOTRIN) 600 MG tablet  Take 1 tablet by mouth Every 6 (Six) Hours As Needed for Mild Pain .             oxyCODONE-acetaminophen (PERCOCET) 5-325 MG per tablet  Take 1-2 tablets by mouth Every 4 (Four) Hours As Needed for Moderate Pain  for up to 3 days.             Prenatal Vit-Fe Fumarate-FA (PRENATAL 27-1) 27-1 MG tablet tablet  Take  by mouth Daily.                 Discharge Diet:     Activity at Discharge:   Activity Instructions     Discharge Activity Restrictions       1) No driving for 2 weeks from delivery and no longer taking narcotics.   2) Do not lift / push / pull more then 10 lbs.       Pelvic Rest       For 6 weeks                  Follow-up Appointments  No future appointments.  Additional Instructions for the Follow-ups that You Need to Schedule     Call MD for problems / concerns.    As directed    Please call with concerns of depression.   Order Comments:  Please call with concerns of depression.            Discharge Follow-up with Specified Provider:    As directed    with primary care obstetrician     Order Comments:  with primary care obstetrician     Follow Up Details:  in 2 weeks from delivery           Discharge Follow-up with Specified Provider: dr lizarraga; 2 Weeks    As directed    To:  dr lizarraga    Follow Up:  2 Weeks                     Bhargavi Schumacher, APRN  01/16/18  9:15 AM  Csd

## 2018-01-16 NOTE — PLAN OF CARE
Problem: Patient Care Overview (Adult)  Goal: Plan of Care Review  Outcome: Outcome(s) achieved Date Met: 18 1345   Coping/Psychosocial Response Interventions   Plan Of Care Reviewed With patient   Patient Care Overview   Progress improving   Outcome Evaluation   Outcome Summary/Follow up Plan D/C home, following up in 2 weeks     Goal: Adult Individualization and Mutuality  Outcome: Outcome(s) achieved Date Met: 18    Goal: Discharge Needs Assessment  Outcome: Outcome(s) achieved Date Met: 18      Problem: Postpartum ( Delivery) (Adult)  Goal: Signs and Symptoms of Listed Potential Problems Will be Absent or Manageable (Postpartum)  Outcome: Outcome(s) achieved Date Met: 18      Problem: Breastfeeding (Adult,NICU,[facility],Obstetrics,Pediatric)  Goal: Signs and Symptoms of Listed Potential Problems Will be Absent or Manageable (Breastfeeding)  Outcome: Outcome(s) achieved Date Met: 18

## 2018-01-16 NOTE — PLAN OF CARE
Problem: Patient Care Overview (Adult)  Goal: Plan of Care Review  Outcome: Ongoing (interventions implemented as appropriate)    Goal: Adult Individualization and Mutuality  Outcome: Ongoing (interventions implemented as appropriate)    Goal: Discharge Needs Assessment  Outcome: Ongoing (interventions implemented as appropriate)      Problem: Postpartum ( Delivery) (Adult)  Goal: Signs and Symptoms of Listed Potential Problems Will be Absent or Manageable (Postpartum)  Outcome: Ongoing (interventions implemented as appropriate)      Problem: Breastfeeding (Adult,NICU,[place],Obstetrics,Pediatric)  Goal: Signs and Symptoms of Listed Potential Problems Will be Absent or Manageable (Breastfeeding)  Outcome: Ongoing (interventions implemented as appropriate)

## 2018-01-16 NOTE — PROGRESS NOTES
Horacio  Hoda Alcantar  : 1990  MRN: [id number]  CSN: [id number]    Post-operative Day #4  Subjective   Her pain is well controlled. Vaginal bleeding is normal in amount. She is ambulating without difficulty. She is passing gas. She is voiding without difficulty. Her babies are doing well. She is breast and bottle feeding. One mildy elevated BP. no HA, change in vision or RUQ pain.      Objective     Min/max vitals past 24 hours:   Temp  Min: 97.7 °F (36.5 °C)  Max: 98.1 °F (36.7 °C)  BP  Min: 137/74  Max: 142/83  Pulse  Min: 89  Max: 101  Resp  Min: 16  Max: 18        General: well developed; well nourished  no acute distress   Abdomen: soft, non-tender; no masses  incision is clean, dry, intact and without drainage  fundus firm and non-tender   Pelvic: Not performed   Ext: Calves NT       Results from last 7 days  Lab Units 18  0814 18  1956 18  1004   WBC 10*3/mm3 11.97* 14.03* 10.86*   HEMOGLOBIN g/dL 12.2 12.0 13.8   HEMATOCRIT % 37.0 36.0 40.8   PLATELETS 10*3/mm3 182 171 201     Lab Results   Component Value Date    RH Positive 2018    HEPBSAG Negative 2017        Assessment   1. POD #4 S/P Primary  (LTCS)  2. Gestational HTN: mostly normotensive since surgery.      Plan   1. Continue routine post-operative care  2. Ambulate  3. Remove staples and place steristrips  4. Discharge to home  5. Advised no tampons or intercourse for 6 weeks.  6. D/C questions all answered    Quoc Hernandez MD  2018  8:33 AM

## 2022-04-27 ENCOUNTER — TELEPHONE (OUTPATIENT)
Dept: SURGERY | Facility: CLINIC | Age: 32
End: 2022-04-27

## 2022-04-27 NOTE — TELEPHONE ENCOUNTER
CALLED PT TO SCHEDULE APPT/CHECK W/PT IF SHE HAD IMAGING/IF PT DID NOT HAVE IMAGING NEXT AVAILABLE W/APRIL/IF PT HAD IMAGING THIS CAN BE SCHEDULED W/[facility]/LMOM

## 2022-10-08 NOTE — PLAN OF CARE
Problem: Breastfeeding (Adult,NICU,[place],Obstetrics,Pediatric)  Goal: Signs and Symptoms of Listed Potential Problems Will be Absent or Manageable (Breastfeeding)  Outcome: Ongoing (interventions implemented as appropriate)         · Continue midodrine 10 mg t i d

## (undated) DEVICE — GLV SURG EUDERMIC PF LTX 8 STRL

## (undated) DEVICE — SUT PLAIN  3/0 CT1 27IN 842H

## (undated) DEVICE — PK C/SECT 10

## (undated) DEVICE — COATED VICRYL  (POLYGLACTIN 910) SUTURE, VIOLET BRAIDED, STERILE, SYNTHETIC ABSORBABLE SUTURE: Brand: COATED VICRYL

## (undated) DEVICE — TRY SPINE BLCK WHITACRE 25G 3X5IN

## (undated) DEVICE — CATHETER,URETHRAL,REDRUBBER,STERILE,22FR: Brand: MEDLINE

## (undated) DEVICE — STRAP STIRUP SLP RNG 19X3.5IN DISP

## (undated) DEVICE — MAT PREVALON MOBL TRANSFR AIR WO/PAD 39X80IN

## (undated) DEVICE — SOL IRR H2O BTL 1000ML STRL

## (undated) DEVICE — NDL HYPO ECLPS SFTY 18G 1 1/2IN

## (undated) DEVICE — PROXIMATE RH ROTATING HEAD SKIN STAPLERS (35 WIDE) CONTAINS 35 STAINLESS STEEL STAPLES: Brand: PROXIMATE

## (undated) DEVICE — LEX VAG DELIVERY PACK: Brand: MEDLINE INDUSTRIES, INC.

## (undated) DEVICE — TOWEL,OR,DSP,ST,BLUE,STD,4/PK,20PK/CS: Brand: MEDLINE

## (undated) DEVICE — SUT VIC 2/0 CT1 27IN J339H BX/36

## (undated) DEVICE — SUT PROLN 0 CT1 30IN 8424H

## (undated) DEVICE — COVER,LIGHT HANDLE,FLX,1/PK: Brand: MEDLINE INDUSTRIES, INC.

## (undated) DEVICE — SOL IRR NACL 0.9PCT BT 1000ML

## (undated) DEVICE — TRY SKINPREP DRYPREP

## (undated) DEVICE — HANDLE LARYNG F/O LED DISP: Brand: MEDLINE INDUSTRIES, INC.

## (undated) DEVICE — GLV SURG BIOGEL LTX PF 6

## (undated) DEVICE — SUT GUT CHRM 2/0 CT1 27IN 811H

## (undated) DEVICE — SYR ART BLD GAS HEP 1CC

## (undated) DEVICE — SOL IRR SALINE 0.9% 100ML STRL

## (undated) DEVICE — GOWN,REINF,POLY,ECL,PP SLV,XL: Brand: MEDLINE

## (undated) DEVICE — SUT MERSLN 5MM MO4 12IN WHT RS23

## (undated) DEVICE — SCRB SURG BACTOSHIELD CHG 4PCT 4OZ

## (undated) DEVICE — SUT GUT CHRM 1 CTX 36IN 905H